# Patient Record
Sex: MALE | Race: WHITE | Employment: OTHER | URBAN - METROPOLITAN AREA
[De-identification: names, ages, dates, MRNs, and addresses within clinical notes are randomized per-mention and may not be internally consistent; named-entity substitution may affect disease eponyms.]

---

## 2017-06-20 ENCOUNTER — HOSPITAL ENCOUNTER (OUTPATIENT)
Dept: NON INVASIVE DIAGNOSTICS | Age: 82
Discharge: HOME | End: 2017-06-20
Attending: FAMILY MEDICINE | Admitting: FAMILY MEDICINE

## 2017-06-20 DIAGNOSIS — R01.1 CARDIAC MURMUR: ICD-10-CM

## 2017-06-20 LAB
LV EF: 63 %
LVEF MODALITY: NORMAL

## 2017-07-14 ENCOUNTER — TELEPHONE (OUTPATIENT)
Dept: CARDIOLOGY CLINIC | Age: 82
End: 2017-07-14

## 2017-07-21 ENCOUNTER — OFFICE VISIT (OUTPATIENT)
Dept: CARDIOLOGY CLINIC | Age: 82
End: 2017-07-21

## 2017-07-21 VITALS
HEART RATE: 69 BPM | BODY MASS INDEX: 32.58 KG/M2 | DIASTOLIC BLOOD PRESSURE: 70 MMHG | WEIGHT: 215 LBS | HEIGHT: 68 IN | SYSTOLIC BLOOD PRESSURE: 136 MMHG

## 2017-07-21 DIAGNOSIS — I35.0 NONRHEUMATIC AORTIC VALVE STENOSIS: Primary | ICD-10-CM

## 2017-07-21 DIAGNOSIS — R06.02 SOB (SHORTNESS OF BREATH): ICD-10-CM

## 2017-07-21 DIAGNOSIS — I48.19 PERSISTENT ATRIAL FIBRILLATION (HCC): ICD-10-CM

## 2017-07-21 PROCEDURE — 99204 OFFICE O/P NEW MOD 45 MIN: CPT | Performed by: INTERNAL MEDICINE

## 2017-07-21 PROCEDURE — 1123F ACP DISCUSS/DSCN MKR DOCD: CPT | Performed by: INTERNAL MEDICINE

## 2017-07-21 PROCEDURE — 4040F PNEUMOC VAC/ADMIN/RCVD: CPT | Performed by: INTERNAL MEDICINE

## 2017-07-21 PROCEDURE — 93000 ELECTROCARDIOGRAM COMPLETE: CPT | Performed by: INTERNAL MEDICINE

## 2017-07-21 PROCEDURE — G8417 CALC BMI ABV UP PARAM F/U: HCPCS | Performed by: INTERNAL MEDICINE

## 2017-07-21 PROCEDURE — G8427 DOCREV CUR MEDS BY ELIG CLIN: HCPCS | Performed by: INTERNAL MEDICINE

## 2017-07-21 PROCEDURE — 1036F TOBACCO NON-USER: CPT | Performed by: INTERNAL MEDICINE

## 2017-07-21 RX ORDER — ALFUZOSIN HYDROCHLORIDE 10 MG/1
10 TABLET, EXTENDED RELEASE ORAL DAILY
Status: ON HOLD | COMMUNITY
End: 2021-12-14 | Stop reason: HOSPADM

## 2017-07-26 ENCOUNTER — HOSPITAL ENCOUNTER (OUTPATIENT)
Dept: NON INVASIVE DIAGNOSTICS | Age: 82
Discharge: HOME | End: 2017-07-26
Attending: INTERNAL MEDICINE | Admitting: INTERNAL MEDICINE

## 2017-07-26 DIAGNOSIS — R06.02 SHORTNESS OF BREATH: ICD-10-CM

## 2017-07-26 LAB
LV EF: 58 %
LVEF MODALITY: NORMAL

## 2017-08-03 ENCOUNTER — TELEPHONE (OUTPATIENT)
Dept: CARDIOLOGY CLINIC | Age: 82
End: 2017-08-03

## 2017-10-09 ENCOUNTER — TELEPHONE (OUTPATIENT)
Dept: CARDIOLOGY CLINIC | Age: 82
End: 2017-10-09

## 2017-10-09 NOTE — TELEPHONE ENCOUNTER
Pt doesn't want to take the Eliquis anymore it will cost over $400/mo pt will just take ASA daily. Pt can't afford that amount every month.  Pls call to advise Thank you

## 2017-10-10 NOTE — TELEPHONE ENCOUNTER
I spoke with pt wife, but could not relay any information-m due to not on HIPAA. I asked her to have him call us back.

## 2017-10-10 NOTE — TELEPHONE ENCOUNTER
I spoke  With pt wife about coumadin clinic. There is not a clinic in [facility]. I spoke with lab in [facility] for options I called pt PCP and his nurse/ MA stated that Dr Fadi Nathan would be willing to take care of the pt. I attempted to call pt wife to inform her of options but got no answer on the phone.

## 2018-01-17 ENCOUNTER — OFFICE VISIT (OUTPATIENT)
Dept: CARDIOLOGY CLINIC | Age: 83
End: 2018-01-17

## 2018-01-17 VITALS
SYSTOLIC BLOOD PRESSURE: 132 MMHG | WEIGHT: 221 LBS | HEIGHT: 67 IN | BODY MASS INDEX: 34.69 KG/M2 | DIASTOLIC BLOOD PRESSURE: 72 MMHG | HEART RATE: 70 BPM

## 2018-01-17 DIAGNOSIS — I35.0 NONRHEUMATIC AORTIC VALVE STENOSIS: Primary | ICD-10-CM

## 2018-01-17 DIAGNOSIS — I48.20 CHRONIC ATRIAL FIBRILLATION (HCC): ICD-10-CM

## 2018-01-17 PROCEDURE — G8484 FLU IMMUNIZE NO ADMIN: HCPCS | Performed by: INTERNAL MEDICINE

## 2018-01-17 PROCEDURE — G8417 CALC BMI ABV UP PARAM F/U: HCPCS | Performed by: INTERNAL MEDICINE

## 2018-01-17 PROCEDURE — 1036F TOBACCO NON-USER: CPT | Performed by: INTERNAL MEDICINE

## 2018-01-17 PROCEDURE — 99214 OFFICE O/P EST MOD 30 MIN: CPT | Performed by: INTERNAL MEDICINE

## 2018-01-17 PROCEDURE — 4040F PNEUMOC VAC/ADMIN/RCVD: CPT | Performed by: INTERNAL MEDICINE

## 2018-01-17 PROCEDURE — 1123F ACP DISCUSS/DSCN MKR DOCD: CPT | Performed by: INTERNAL MEDICINE

## 2018-01-17 PROCEDURE — G8427 DOCREV CUR MEDS BY ELIG CLIN: HCPCS | Performed by: INTERNAL MEDICINE

## 2018-01-17 RX ORDER — WARFARIN SODIUM 1 MG/1
1 TABLET ORAL
COMMUNITY
End: 2019-01-23 | Stop reason: ALTCHOICE

## 2018-01-17 NOTE — PROGRESS NOTES
[address]       H+P // CONSULT // OUTPATIENT VISIT // Elver Nicole     Referring Doctor Sanjay Bruno MD   Encounter Type Followup     CHIEF COMPLAINT     VisitType [] Acute [x] Chronic     Symptom [x] None [] CP [] SOB [] Dizzy [] Palps [] Fatigue     Problems AS, Afib      HISTORY OF PRESENT ILLNESS     Doing well. Denies cp, sob. Compliant with meds. Switched to coumadin due to cost.       Symptom Y N Frequency Duration Severity Modifying Assoc Sx Other   CP [] [x]         SOB [] [x]         Dizzy [] [x]         Syncope [] [x]         Palpitations [] [x]           COMPLIANCE     Category Meds Diet Salt Exercise Tobacco Alcohol Drugs   Compliant [x] [x] [x] [x] [x] [x] [x]   [x]Counseling given on all above above categories    HISTORY/ALLERGIES/ROS     MedHx:  has no past medical history on file. SurgHx:  has a past surgical history that includes hernia repair; Hip Arthroplasty; and shoulder surgery. SocHx:  reports that he has quit smoking. His smoking use included Cigars. He has never used smokeless tobacco. He reports that he drinks alcohol. He reports that he does not use drugs. FamHx: family history is not on file. Allergies: Review of patient's allergies indicates no known allergies. ROS:  [x]Full ROS obtained and negative except as mentioned in HPI     MEDICATIONS      Current Outpatient Prescriptions   Medication Sig Dispense Refill    apixaban (ELIQUIS) 5 MG TABS tablet Take 1 tablet by mouth 2 times daily 60 tablet 6    alfuzosin (UROXATRAL) 10 MG extended release tablet Take 10 mg by mouth daily      Misc Natural Products (OSTEO BI-FLEX ADV JOINT SHIELD PO) Take by mouth      Ibuprofen-diphenhydrAMINE HCl (ADVIL PM) 200-25 MG CAPS Take by mouth       No current facility-administered medications for this visit.       Reviewed with patient and will remain unchanged except as mentioned in A/P  PHYSICAL EXAM     Vitals:    01/17/18 1102   BP: 132/72   Pulse: 70      Gen

## 2018-07-17 PROBLEM — I48.0 PAF (PAROXYSMAL ATRIAL FIBRILLATION) (HCC): Status: ACTIVE | Noted: 2018-07-17

## 2018-07-17 PROBLEM — I48.19 PERSISTENT ATRIAL FIBRILLATION (HCC): Status: ACTIVE | Noted: 2018-07-17

## 2018-07-17 NOTE — PATIENT INSTRUCTIONS
~AS   Date EF Detail   Sx   SOB   Hx   No intervention   NYHA   []I         [x]II           []III          []IV   MPI 7/17 NL Small inferior scar v diaphragm   TTE 6/17 7/18 60% Mod AS MG 24, mild AI, mod TR 69   Plan   Echo 6/18  Educated on s/s of worsening   ~Afib   Date Detail   Type  persistent   R/R  Irreg, controlled   R/RM  Reviewed   CVS  >1   AC/AP  Eliquis->Coumadin due to cost   Plan  Continue current meds

## 2018-07-20 ENCOUNTER — OFFICE VISIT (OUTPATIENT)
Dept: CARDIOLOGY CLINIC | Age: 83
End: 2018-07-20

## 2018-07-20 DIAGNOSIS — I48.19 PERSISTENT ATRIAL FIBRILLATION (HCC): ICD-10-CM

## 2018-07-20 DIAGNOSIS — I35.0 NONRHEUMATIC AORTIC VALVE STENOSIS: Primary | ICD-10-CM

## 2018-07-20 PROCEDURE — 99999 PR OFFICE/OUTPT VISIT,PROCEDURE ONLY: CPT | Performed by: INTERNAL MEDICINE

## 2018-12-03 ENCOUNTER — TELEPHONE (OUTPATIENT)
Dept: CARDIOLOGY CLINIC | Age: 83
End: 2018-12-03

## 2018-12-03 NOTE — TELEPHONE ENCOUNTER
Having increased fatigue, , weakness, and SOB the last few weeks . Made an appt and echo  1/16/19 because that was the first available . Should he be seen sooner ?

## 2018-12-04 NOTE — TELEPHONE ENCOUNTER
Patient can be added on the TAVR clinic on 11/20. Please see if the patient's echo can be scheduled prior to the appointment the same day or any day before the appointment. Thanks!

## 2018-12-20 ENCOUNTER — OFFICE VISIT (OUTPATIENT)
Dept: CARDIOLOGY CLINIC | Age: 83
End: 2018-12-20
Payer: MEDICARE

## 2018-12-20 ENCOUNTER — HOSPITAL ENCOUNTER (OUTPATIENT)
Dept: NON INVASIVE DIAGNOSTICS | Age: 83
Discharge: HOME | End: 2018-12-20
Payer: MEDICARE

## 2018-12-20 VITALS
BODY MASS INDEX: 32.82 KG/M2 | HEIGHT: 67 IN | WEIGHT: 209.12 LBS | DIASTOLIC BLOOD PRESSURE: 72 MMHG | HEART RATE: 68 BPM | SYSTOLIC BLOOD PRESSURE: 156 MMHG

## 2018-12-20 DIAGNOSIS — I48.19 PERSISTENT ATRIAL FIBRILLATION (HCC): ICD-10-CM

## 2018-12-20 DIAGNOSIS — I35.0 NONRHEUMATIC AORTIC VALVE STENOSIS: ICD-10-CM

## 2018-12-20 DIAGNOSIS — I35.0 NONRHEUMATIC AORTIC VALVE STENOSIS: Primary | ICD-10-CM

## 2018-12-20 LAB
LEFT VENTRICULAR EJECTION FRACTION HIGH VALUE: 65 %
LEFT VENTRICULAR EJECTION FRACTION MODE: NORMAL
LV EF: 65 %
LVEF MODALITY: NORMAL

## 2018-12-20 PROCEDURE — 1036F TOBACCO NON-USER: CPT | Performed by: INTERNAL MEDICINE

## 2018-12-20 PROCEDURE — G8427 DOCREV CUR MEDS BY ELIG CLIN: HCPCS | Performed by: INTERNAL MEDICINE

## 2018-12-20 PROCEDURE — 1101F PT FALLS ASSESS-DOCD LE1/YR: CPT | Performed by: INTERNAL MEDICINE

## 2018-12-20 PROCEDURE — G8484 FLU IMMUNIZE NO ADMIN: HCPCS | Performed by: INTERNAL MEDICINE

## 2018-12-20 PROCEDURE — 1123F ACP DISCUSS/DSCN MKR DOCD: CPT | Performed by: INTERNAL MEDICINE

## 2018-12-20 PROCEDURE — 93306 TTE W/DOPPLER COMPLETE: CPT

## 2018-12-20 PROCEDURE — 4040F PNEUMOC VAC/ADMIN/RCVD: CPT | Performed by: INTERNAL MEDICINE

## 2018-12-20 PROCEDURE — 99214 OFFICE O/P EST MOD 30 MIN: CPT | Performed by: INTERNAL MEDICINE

## 2018-12-20 PROCEDURE — G8417 CALC BMI ABV UP PARAM F/U: HCPCS | Performed by: INTERNAL MEDICINE

## 2018-12-20 NOTE — LETTER
60 tablet 6    alfuzosin (UROXATRAL) 10 MG extended release tablet Take 10 mg by mouth daily      Misc Natural Products (OSTEO BI-FLEX ADV JOINT SHIELD PO) Take by mouth      Ibuprofen-diphenhydrAMINE HCl (ADVIL PM) 200-25 MG CAPS Take by mouth       No current facility-administered medications for this visit. Reviewed with patient and will remain unchanged except as mentioned in A/P  PHYSICAL EXAM     Vitals:    12/20/18 1105   BP: (!) 156/72   Pulse:       Gen Alert, coop, no distress Heart  RRR, 4/6   Head NC, AT, no abnorm Abd  Soft, NT, +BS, no mass, no OM   Eyes PER, conj/corn clear Ext  Ext nl, AT, no C/C, tr edema   Nose Nares nl, no drain, NT Pulse 2+ and symmetric   Throat Lips, mucosa, tongue nl Skin Col/text/turg nl, no vis rash/les   Neck S/S, TM, NT, no bruit/JVD Psych Nl mood and affect   Lung CTA-B, unlabored, no DTP Lymph   No cervical or axillary LA   Ch wall NT, no deform Neuro  Nl gross M/S exam     ASSESSMENT AND PLAN     ~AS   Date EF Detail   Sx   SOB   Hx   No intervention   NYHA   []I         [x]II           []III          []IV   MPI 7/17 NL Small inferior scar v diaphragm   TTE 6/17 12/18 60%  65% Mod AS MG 24, mild AI, mod TR 69  Mod AS MG 32, mild AI, mod TR   Plan   Educated on s/s of worsening  Repeat echo in 6 months   ~Afib   Date Detail   Type  persistent   R/R  Irreg, controlled   R/RM  Reviewed   CVS  >1   AC/AP  Eliquis->Coumadin due to cost   Plan  Continue current meds   ~Followup  Interval: 6 months  Tests/Labs: Echo with [address], Margoth Cheatham am scribing for and in the presence of Bharti Grant MD.   Signed, Margoth Cheatham 12/17/18 11:30 AM   Provider Yas Hansen is working as a scribe for and in the presence of me (Bharti Grant MD). Working as a scribe, Margoth Cheatham may have prepopulated components of this note with my historical  intellectual property under my direct supervision.   Any additions to this intellectual

## 2019-01-23 ENCOUNTER — TELEPHONE (OUTPATIENT)
Dept: CARDIOLOGY CLINIC | Age: 84
End: 2019-01-23

## 2019-07-29 PROBLEM — I48.20 CHRONIC ATRIAL FIBRILLATION (HCC): Status: ACTIVE | Noted: 2019-07-29

## 2019-07-29 NOTE — PROGRESS NOTES
Soft, NT, +BS, no mass, no OM   Eyes PER, conj/corn clear Ext  Ext nl, AT, no C/C/E   Nose Nares nl, no drain, NT Pulse 2+ and symmetric   Throat Lips, mucosa, tongue nl Skin Col/text/turg nl, no vis rash/les   Neck S/S, TM, NT, no bruit/JVD Psych Nl mood and affect   Lung CTA-B, unlabored, no DTP Lymph   No cervical or axillary LA   Ch wall NT, no deform Neuro  Nl gross M/S exam     ASSESSMENT AND PLAN      ~AS   Date EF Detail   Sx   SOB   Hx   No intervention   NYHA   []I         [x]II           []III          []IV   MPI 7/17 NL Small inferior scar v diaphragm   TTE 6/17 12/18 7/19 60%  65%  65% Mod AS MG 24, mild AI, mod TR 69  Mod AS MG 32, mild AI, mod TR  Mod AS MG 27, mod TR   Plan   Educated on s/s of worsening  Repeat echo in 6 months, sooner with sx worsening   ~Afib   Date Detail   Type  persistent   R/R  Irreg, controlled   R/RM  Reviewed   CVS  >1   AC/AP  xarelto   Plan  Continue current meds   ~Followup  Interval: 6 months with echo    [address] Param Alvarenga, am scribing for and in the presence of Elias Davalos MD.   SignedParam 07/29/19 3:35 PM   Provider Marlin Emily is working as a scribe for and in the presence of me (Elias Davalos MD). Working as a scribe, Param Foster may have prepopulated components of this note with my historical  intellectual property under my direct supervision. Any additions to this intellectual property were performed in my presence and at my direction.   Furthermore, the content and accuracy of this note have been reviewed by me Elias Davalos MD).  8/1/2019 2:05 PM

## 2019-07-31 ENCOUNTER — HOSPITAL ENCOUNTER (OUTPATIENT)
Dept: NON INVASIVE DIAGNOSTICS | Age: 84
Discharge: HOME | End: 2019-07-31
Payer: MEDICARE

## 2019-07-31 DIAGNOSIS — I35.0 NONRHEUMATIC AORTIC VALVE STENOSIS: ICD-10-CM

## 2019-07-31 LAB
LV EF: 65 %
LVEF MODALITY: NORMAL

## 2019-07-31 PROCEDURE — 93306 TTE W/DOPPLER COMPLETE: CPT

## 2019-08-01 ENCOUNTER — OFFICE VISIT (OUTPATIENT)
Dept: CARDIOLOGY CLINIC | Age: 84
End: 2019-08-01
Payer: MEDICARE

## 2019-08-01 VITALS
DIASTOLIC BLOOD PRESSURE: 84 MMHG | BODY MASS INDEX: 33.09 KG/M2 | WEIGHT: 210.8 LBS | HEIGHT: 67 IN | HEART RATE: 56 BPM | OXYGEN SATURATION: 92 % | SYSTOLIC BLOOD PRESSURE: 130 MMHG

## 2019-08-01 DIAGNOSIS — I35.0 NONRHEUMATIC AORTIC VALVE STENOSIS: Primary | ICD-10-CM

## 2019-08-01 DIAGNOSIS — I48.20 CHRONIC ATRIAL FIBRILLATION (HCC): ICD-10-CM

## 2019-08-01 PROCEDURE — 1036F TOBACCO NON-USER: CPT | Performed by: INTERNAL MEDICINE

## 2019-08-01 PROCEDURE — G8417 CALC BMI ABV UP PARAM F/U: HCPCS | Performed by: INTERNAL MEDICINE

## 2019-08-01 PROCEDURE — 4040F PNEUMOC VAC/ADMIN/RCVD: CPT | Performed by: INTERNAL MEDICINE

## 2019-08-01 PROCEDURE — 99213 OFFICE O/P EST LOW 20 MIN: CPT | Performed by: INTERNAL MEDICINE

## 2019-08-01 PROCEDURE — G8427 DOCREV CUR MEDS BY ELIG CLIN: HCPCS | Performed by: INTERNAL MEDICINE

## 2019-08-01 PROCEDURE — 1123F ACP DISCUSS/DSCN MKR DOCD: CPT | Performed by: INTERNAL MEDICINE

## 2019-08-01 NOTE — LETTER
Braulio Khan MD).  8/1/2019 2:05 PM            If you have questions, please do not hesitate to call me. I look forward to following Charisse Miller along with you.     Sincerely,        Nilson Petersen MD

## 2020-02-06 ENCOUNTER — OFFICE VISIT (OUTPATIENT)
Dept: CARDIOLOGY CLINIC | Age: 85
End: 2020-02-06
Payer: MEDICARE

## 2020-02-06 ENCOUNTER — HOSPITAL ENCOUNTER (OUTPATIENT)
Dept: NON INVASIVE DIAGNOSTICS | Age: 85
Discharge: HOME | End: 2020-02-06
Payer: MEDICARE

## 2020-02-06 ENCOUNTER — HOSPITAL ENCOUNTER (OUTPATIENT)
Age: 85
Discharge: HOME | End: 2020-02-06
Payer: MEDICARE

## 2020-02-06 VITALS
BODY MASS INDEX: 31.88 KG/M2 | HEART RATE: 67 BPM | HEIGHT: 67 IN | DIASTOLIC BLOOD PRESSURE: 82 MMHG | WEIGHT: 203.12 LBS | SYSTOLIC BLOOD PRESSURE: 162 MMHG | OXYGEN SATURATION: 96 %

## 2020-02-06 LAB
BUN BLDV-MCNC: 20 MG/DL (ref 7–20)
CREAT SERPL-MCNC: 0.9 MG/DL (ref 0.8–1.3)
GFR AFRICAN AMERICAN: >60
GFR NON-AFRICAN AMERICAN: >60
LV EF: 65 %
LVEF MODALITY: NORMAL

## 2020-02-06 PROCEDURE — 36415 COLL VENOUS BLD VENIPUNCTURE: CPT

## 2020-02-06 PROCEDURE — 1036F TOBACCO NON-USER: CPT | Performed by: INTERNAL MEDICINE

## 2020-02-06 PROCEDURE — G8427 DOCREV CUR MEDS BY ELIG CLIN: HCPCS | Performed by: INTERNAL MEDICINE

## 2020-02-06 PROCEDURE — 4040F PNEUMOC VAC/ADMIN/RCVD: CPT | Performed by: INTERNAL MEDICINE

## 2020-02-06 PROCEDURE — 82565 ASSAY OF CREATININE: CPT

## 2020-02-06 PROCEDURE — 93306 TTE W/DOPPLER COMPLETE: CPT

## 2020-02-06 PROCEDURE — G8484 FLU IMMUNIZE NO ADMIN: HCPCS | Performed by: INTERNAL MEDICINE

## 2020-02-06 PROCEDURE — G8417 CALC BMI ABV UP PARAM F/U: HCPCS | Performed by: INTERNAL MEDICINE

## 2020-02-06 PROCEDURE — 1123F ACP DISCUSS/DSCN MKR DOCD: CPT | Performed by: INTERNAL MEDICINE

## 2020-02-06 PROCEDURE — 99214 OFFICE O/P EST MOD 30 MIN: CPT | Performed by: INTERNAL MEDICINE

## 2020-02-06 PROCEDURE — 84520 ASSAY OF UREA NITROGEN: CPT

## 2020-02-06 RX ORDER — TAMSULOSIN HYDROCHLORIDE 0.4 MG/1
0.4 CAPSULE ORAL DAILY
COMMUNITY
Start: 2020-01-23 | End: 2020-05-20 | Stop reason: ALTCHOICE

## 2020-02-06 NOTE — LETTER
[address] [address]  Phone: [phone]  Fax: [phone]    Linda Mathew MD        February 6, 2020     Sherri Aleman, [address] [place] [address] Dr    Patient: Milena Pritchett  MR Number: [id number]  YOB: 1935  Date of Visit: 2/6/2020    Dear Dr. Sherri Aleman:      [address]     H+P // CONSULT // OUTPATIENT VISIT // Toñito Mcnamara     Referring Doctor Sherri Aleman MD   Encounter Type Followup     CHIEF COMPLAINT     Visit Type Chronic   Symptoms SOB increasing   Problems AS, cAFIB     HISTORY OF PRESENT ILLNESS     ? GEN - Doing well. Increased sob with exertion. ? AS - severe by echo today. SOB increasing  ? cAFIB - No palpitations. Tolerating xarelto without bleeding. ? MED - Compliant with CV meds listed below without notable side effects     HISTORY/ALLERGIES/ROS     MedHx:  has no past medical history on file. SurgHx:  has a past surgical history that includes hernia repair; Hip Arthroplasty; and shoulder surgery. SocHx:   reports that he has quit smoking. His smoking use included cigars. He has never used smokeless tobacco. He reports current alcohol use. He reports that he does not use drugs. FamHx: family history is not on file. Allergies: Patient has no known allergies. ROS:  [x]Full ROS obtained and negative except as mentioned in HPI     MEDICATIONS      Current Outpatient Medications   Medication Sig Dispense Refill    XARELTO 20 MG TABS tablet TAKE ONE TABLET BY MOUTH DAILY WITH BREAKFAST 30 tablet 11    alfuzosin (UROXATRAL) 10 MG extended release tablet Take 10 mg by mouth daily      Misc Natural Products (OSTEO BI-FLEX ADV JOINT SHIELD PO) Take by mouth      Ibuprofen-diphenhydrAMINE HCl (ADVIL PM) 200-25 MG CAPS Take by mouth as needed        No current facility-administered medications for this visit. Robel Fairbanks MD).  2/6/2020 10:34 AM          If you have questions, please do not hesitate to call me. I look forward to following Melida Urbina along with you.     Sincerely,        Allyssa Marino MD

## 2020-02-19 ENCOUNTER — HOSPITAL ENCOUNTER (OUTPATIENT)
Dept: CT IMAGING | Age: 85
Discharge: HOME | End: 2020-02-19
Payer: MEDICARE

## 2020-02-19 ENCOUNTER — HOSPITAL ENCOUNTER (OUTPATIENT)
Dept: VASCULAR LAB | Age: 85
Discharge: HOME | End: 2020-02-19
Payer: MEDICARE

## 2020-02-19 PROCEDURE — 74174 CTA ABD&PLVS W/CONTRAST: CPT

## 2020-02-19 PROCEDURE — 93880 EXTRACRANIAL BILAT STUDY: CPT

## 2020-02-19 PROCEDURE — 6360000004 HC RX CONTRAST MEDICATION: Performed by: INTERNAL MEDICINE

## 2020-02-19 RX ADMIN — IOPAMIDOL 150 ML: 755 INJECTION, SOLUTION INTRAVENOUS at 12:36

## 2020-02-26 ENCOUNTER — HOSPITAL ENCOUNTER (OUTPATIENT)
Dept: CARDIAC CATH/INVASIVE PROCEDURES | Age: 85
Discharge: HOME | End: 2020-02-27
Attending: INTERNAL MEDICINE | Admitting: INTERNAL MEDICINE
Payer: MEDICARE

## 2020-02-26 VITALS
SYSTOLIC BLOOD PRESSURE: 189 MMHG | DIASTOLIC BLOOD PRESSURE: 75 MMHG | HEIGHT: 67 IN | OXYGEN SATURATION: 97 % | HEART RATE: 53 BPM | BODY MASS INDEX: 31.86 KG/M2 | WEIGHT: 203 LBS | TEMPERATURE: 98 F | RESPIRATION RATE: 16 BRPM

## 2020-02-26 LAB
ANION GAP SERPL CALCULATED.3IONS-SCNC: 10 MMOL/L (ref 3–16)
BASOPHILS ABSOLUTE: 0 K/UL (ref 0–0.2)
BASOPHILS RELATIVE PERCENT: 0.6 %
BUN BLDV-MCNC: 19 MG/DL (ref 7–20)
CALCIUM SERPL-MCNC: 9.6 MG/DL (ref 8.3–10.6)
CHLORIDE BLD-SCNC: 101 MMOL/L (ref 99–110)
CO2: 27 MMOL/L (ref 21–32)
CREAT SERPL-MCNC: 0.8 MG/DL (ref 0.8–1.3)
EOSINOPHILS ABSOLUTE: 0.1 K/UL (ref 0–0.6)
EOSINOPHILS RELATIVE PERCENT: 1.1 %
GFR AFRICAN AMERICAN: >60
GFR NON-AFRICAN AMERICAN: >60
GLUCOSE BLD-MCNC: 100 MG/DL (ref 70–99)
HCT VFR BLD CALC: 41.6 % (ref 40.5–52.5)
HEMOGLOBIN: 13.8 G/DL (ref 13.5–17.5)
LYMPHOCYTES ABSOLUTE: 1 K/UL (ref 1–5.1)
LYMPHOCYTES RELATIVE PERCENT: 19 %
MCH RBC QN AUTO: 30.5 PG (ref 26–34)
MCHC RBC AUTO-ENTMCNC: 33.1 G/DL (ref 31–36)
MCV RBC AUTO: 92.2 FL (ref 80–100)
MONOCYTES ABSOLUTE: 0.6 K/UL (ref 0–1.3)
MONOCYTES RELATIVE PERCENT: 10.6 %
NEUTROPHILS ABSOLUTE: 3.8 K/UL (ref 1.7–7.7)
NEUTROPHILS RELATIVE PERCENT: 68.7 %
PDW BLD-RTO: 15.6 % (ref 12.4–15.4)
PLATELET # BLD: 195 K/UL (ref 135–450)
PMV BLD AUTO: 7.9 FL (ref 5–10.5)
POTASSIUM SERPL-SCNC: 4.5 MMOL/L (ref 3.5–5.1)
RBC # BLD: 4.51 M/UL (ref 4.2–5.9)
SODIUM BLD-SCNC: 138 MMOL/L (ref 136–145)
WBC # BLD: 5.5 K/UL (ref 4–11)

## 2020-02-26 PROCEDURE — 99153 MOD SED SAME PHYS/QHP EA: CPT

## 2020-02-26 PROCEDURE — C1894 INTRO/SHEATH, NON-LASER: HCPCS

## 2020-02-26 PROCEDURE — 85025 COMPLETE CBC W/AUTO DIFF WBC: CPT

## 2020-02-26 PROCEDURE — 6360000002 HC RX W HCPCS

## 2020-02-26 PROCEDURE — 93005 ELECTROCARDIOGRAM TRACING: CPT | Performed by: INTERNAL MEDICINE

## 2020-02-26 PROCEDURE — 2709999900 HC NON-CHARGEABLE SUPPLY

## 2020-02-26 PROCEDURE — 93454 CORONARY ARTERY ANGIO S&I: CPT | Performed by: INTERNAL MEDICINE

## 2020-02-26 PROCEDURE — 2500000003 HC RX 250 WO HCPCS

## 2020-02-26 PROCEDURE — 93454 CORONARY ARTERY ANGIO S&I: CPT

## 2020-02-26 PROCEDURE — C1769 GUIDE WIRE: HCPCS

## 2020-02-26 PROCEDURE — 36415 COLL VENOUS BLD VENIPUNCTURE: CPT

## 2020-02-26 PROCEDURE — 80048 BASIC METABOLIC PNL TOTAL CA: CPT

## 2020-02-26 PROCEDURE — 6360000004 HC RX CONTRAST MEDICATION: Performed by: INTERNAL MEDICINE

## 2020-02-26 PROCEDURE — 99152 MOD SED SAME PHYS/QHP 5/>YRS: CPT

## 2020-02-26 RX ADMIN — IOPAMIDOL 26 ML: 755 INJECTION, SOLUTION INTRAVENOUS at 16:05

## 2020-02-26 NOTE — OP NOTE
[address]   Procedure Note      Procedure: Cors only  Indication: AS, SOB  Consent: Verbal and/or written consent obtained prior to procedure. Sedation: Minimal conscious sedation utilized for comfort. Complic: None  EBL:  <95DK  Specimens: None  Fluoro: 2.3 min  Contrast: 26 cc  Access: RRA  Ultrasound: Ultrasound guidance used to determine aforementioned artery patency, size (>2mm), anatomic variations and ideal puncture location. Real-time ultrasound utilized concurrent with vascular needle entry into the artery. Image(s) permanently recorded and reported in the patient chart.       Findings:   LM  Normal  LAD  Normal  CX  Normal  RCA  Normal  LVG  NA  LVEDP NA     Intervention:   None    Post Cath Dx:   Normal coronaries  Continued workup for TAVR

## 2020-02-26 NOTE — H&P
NT, no deform Neuro  Nl gross M/S exam       ASSESSMENT AND PLAN     ~AS  Severe, [facility] today

## 2020-02-26 NOTE — PRE SEDATION
visible)    ASA Classification:  Class 2 - A normal healthy patient with mild systemic disease      Kanwal Scale: Activity:  2 - Able to move 4 extremities voluntarily on command  Respiration:  2 - Able to breathe deeply and cough freely  Circulation:  2 - BP+/- 20mmHg of normal  Consciousness:  2 - Fully awake  Oxygen Saturation (color):  2 - Able to maintain oxygen saturation >92% on room air    Sedation/Anesthesia Plan:  Guard the patient's safety and welfare. Minimize physical discomfort and pain. Minimize negative psychological responses to treatment by providing sedation and analgesia and maximize the potential amnesia. Patient to meet pre-procedure discharge plan.     Medication Planned:  midazolam intravenously and fentanyl intravenously    Patient is an appropriate candidate for plan of sedation: yes      Electronically signed by Karrie Faulkner MD on 2/26/2020 at 3:31 PM

## 2020-02-27 LAB
EKG ATRIAL RATE: 45 BPM
EKG DIAGNOSIS: NORMAL
EKG Q-T INTERVAL: 410 MS
EKG QRS DURATION: 96 MS
EKG QTC CALCULATION (BAZETT): 384 MS
EKG R AXIS: 20 DEGREES
EKG T AXIS: -22 DEGREES
EKG VENTRICULAR RATE: 53 BPM

## 2020-02-27 PROCEDURE — 93010 ELECTROCARDIOGRAM REPORT: CPT | Performed by: INTERNAL MEDICINE

## 2020-03-05 ENCOUNTER — OFFICE VISIT (OUTPATIENT)
Dept: CARDIOTHORACIC SURGERY | Age: 85
End: 2020-03-05
Payer: MEDICARE

## 2020-03-05 ENCOUNTER — TELEPHONE (OUTPATIENT)
Dept: CARDIOLOGY CLINIC | Age: 85
End: 2020-03-05

## 2020-03-05 VITALS
OXYGEN SATURATION: 98 % | DIASTOLIC BLOOD PRESSURE: 64 MMHG | BODY MASS INDEX: 30.62 KG/M2 | TEMPERATURE: 97.4 F | SYSTOLIC BLOOD PRESSURE: 118 MMHG | WEIGHT: 202 LBS | HEART RATE: 56 BPM | HEIGHT: 68 IN

## 2020-03-05 PROCEDURE — 99204 OFFICE O/P NEW MOD 45 MIN: CPT | Performed by: THORACIC SURGERY (CARDIOTHORACIC VASCULAR SURGERY)

## 2020-03-05 PROCEDURE — G8427 DOCREV CUR MEDS BY ELIG CLIN: HCPCS | Performed by: THORACIC SURGERY (CARDIOTHORACIC VASCULAR SURGERY)

## 2020-03-05 PROCEDURE — 4040F PNEUMOC VAC/ADMIN/RCVD: CPT | Performed by: THORACIC SURGERY (CARDIOTHORACIC VASCULAR SURGERY)

## 2020-03-05 PROCEDURE — 1123F ACP DISCUSS/DSCN MKR DOCD: CPT | Performed by: THORACIC SURGERY (CARDIOTHORACIC VASCULAR SURGERY)

## 2020-03-05 PROCEDURE — G8484 FLU IMMUNIZE NO ADMIN: HCPCS | Performed by: THORACIC SURGERY (CARDIOTHORACIC VASCULAR SURGERY)

## 2020-03-05 PROCEDURE — G8417 CALC BMI ABV UP PARAM F/U: HCPCS | Performed by: THORACIC SURGERY (CARDIOTHORACIC VASCULAR SURGERY)

## 2020-03-05 PROCEDURE — 1036F TOBACCO NON-USER: CPT | Performed by: THORACIC SURGERY (CARDIOTHORACIC VASCULAR SURGERY)

## 2020-03-05 SDOH — HEALTH STABILITY: MENTAL HEALTH: HOW MANY STANDARD DRINKS CONTAINING ALCOHOL DO YOU HAVE ON A TYPICAL DAY?: 5 OR 6

## 2020-03-05 NOTE — PROGRESS NOTES
Department of Cardiovascular & Thoracic Surgery  History and Physical / Consultation          PCP: Augusta Goldstein MD    Referring Physician: Lorie Gamble    DIAGNOSIS:  Severe symptomatic AS    CHIEF COMPLAINT:  dypnea with minimal exertion    History Obtained From:  patient, electronic medical record    HISTORY OF PRESENT ILLNESS:      The patient is a 81 y.o. male with significant past medical history of PAF who presents with severe symptomatic AS. His dyspnea was worse in the cold weather. Past Medical History:        Diagnosis Date    Atrial fibrillation ([address]     BPH (benign prostatic hyperplasia)        Past Surgical History:        Procedure Laterality Date    HERNIA REPAIR Left     HIP ARTHROPLASTY Left     SHOULDER SURGERY Left        Medications:   Current Outpatient Medications   Medication Sig Dispense Refill    tamsulosin (FLOMAX) 0.4 MG capsule       XARELTO 20 MG TABS tablet TAKE ONE TABLET BY MOUTH DAILY WITH BREAKFAST 30 tablet 11    alfuzosin (UROXATRAL) 10 MG extended release tablet Take 10 mg by mouth daily      Misc Natural Products (OSTEO BI-FLEX ADV JOINT SHIELD PO) Take by mouth      Ibuprofen-diphenhydrAMINE HCl (ADVIL PM) 200-25 MG CAPS Take by mouth as needed        No current facility-administered medications for this visit. Allergies:  Patient has no known allergies. Social History:    TOBACCO:   reports that he has quit smoking. His smoking use included cigars. He has quit using smokeless tobacco.  His smokeless tobacco use included chew. ETOH:   reports current alcohol use of about 7.0 standard drinks of alcohol per week. DRUGS:   reports no history of drug use. LIFESTYLE: Farmer    MARITAL STATUS:    OCCUPATION:  [employer]    Family History:      Non-contributory (grandchildren with congenital siunus node dysfunction)    REVIEW OF SYSTEMS:      Personally reviewed and agree with Deyanira Tena's progress note from 3/5/2020.     PHYSICAL EXAM:    VITALS:  BP 118/64 (Site: Left Upper Arm)   Pulse 56   Temp 97.4 °F (36.3 °C) (Oral)   Ht 5' 8\" (1.727 m)   Wt 202 lb (91.6 kg)   SpO2 98%   BMI 30.71 kg/m²     Eyes:  lids and lashes normal, pupils equal and round, extra ocular muscles intact, sclera clear, conjunctiva normal    Head/ENT:  Normocephalic, atraumatic, normal gums, & palate, oropharynx without erythema or exudates    Neck:  supple, symmetrical, trachea midline, no lymphadenopathy, no jugular venous distension, no carotid bruits and MASSES:  no masses    Lungs:  no increased work of breathing, good air exchange, no retractions and clear to auscultation, no crackles or wheezing, no palpable / percussible abnormalities    Cardiovascular:  irregularly irregular rhythm and systolic murmur: holosystolic 3/6, harsh throughout the precordium, loudest at right 2nd ICS    Pulses:  Right dorsalis pedis 2, Left dorsalis pedis 2, Right posterior tibial 2, Left Posterior tibial 2, Right Femoral 2, Left Femoral 2, Right radial 2, and Left radial 2    Abdomen:  Soft, normal bowel sounds, non-tender, no hepatosplenomegaly, aorta likely normal and bruits absent    Musculoskeletal:  Back is straight and non-tender,  No CVAT, full ROM of upper and lower extremities. Extremities:   No clubbing, or cyanosis, or edema     Skin: warm and normal turgor, no ulcers, infections, or rashes, no jaundice    Neurological: awake, alert and oriented x 3, motor 5/5 bilateral upper and lower extremities, sensation grossly intact    Psychiatric: Mood and affect appear appropriate    DATA:    Other diagnostic test:  [facility] no occlusive CAD    ASSESSMENT AND PLAN:    Severe symptomatic AS, PAF    I discussed TAVR vs SAVR vs medicine / doing nothing with the patient, his wife and daughter. I agree TAVR is the best option for him given his active lifestyle and age. I answered all their questions. He will need to hold xarelto prior to TAVR.     Electronically signed by Torres Rousseau MD on 3/5/2020 at 12:34 PM

## 2020-03-05 NOTE — PROGRESS NOTES
Review of Systems:  Constitutional:  No night sweats, headaches, weight loss. Eyes:  No glaucoma, cataracts. Uses readers  ENMT:  No nosebleeds, went to [facility], all work done  Cardiac:  A-Fib. Aortic valve disease  Vascular:  No claudication,no  varicosities. GI:  No PUD, heartburn. :  No kidney stones, frequent UTIs  Musculoskeletal:  + arthritis,   Respiratory:  Get pneumonia almost every year, occasional couphing spells. Integumentary:  No dermatitis, itching, rash. Neurological:  No stroke, no TIAs,no  Seizures. numbnes in finger and thumb on R hand  Psychiatric:  No depression,no  anxiety. Endocrine: No diabetes, no thyroid issues. Hematologic:  Easy  bleeding, +easy bruising. Immunologic:  No known cancer,no  steroid therapies.

## 2020-03-05 NOTE — TELEPHONE ENCOUNTER
Spoke to pts wife. Aware that TAVR is scheduled for 3/31/20 with PAT on 3/19/20. Aware to arrive for PAT at 2pm and that they will see Dr. Pako Pruitt in office to follow. Verbalized understanding of all.  Rupal SCHAEFER

## 2020-03-17 ENCOUNTER — TELEPHONE (OUTPATIENT)
Dept: CARDIOLOGY CLINIC | Age: 85
End: 2020-03-17

## 2020-03-17 NOTE — TELEPHONE ENCOUNTER
Pt wife called stating that pt wants to postpone TAVR until the coronavirus epidemic has passed. She states that he is feeling well with no specific HF symptoms at this time. Let her know to contact us if he develops sx and that we will reschedule his TAVR at a later date.  Rupal SCHAEFER

## 2020-04-02 ENCOUNTER — TELEPHONE (OUTPATIENT)
Dept: CARDIOLOGY CLINIC | Age: 85
End: 2020-04-02

## 2020-04-02 NOTE — TELEPHONE ENCOUNTER
Spoke to pts wife regarding TAVR date. She states that pt wants to wait until at least May for his procedure due to coronavirus pandemic. She states that he is doing well with no SOB, CP, dizziness, syncope. States that he gets tired but is resting when not active. Instructed to call if he experiences any HF symptoms. Verbalized understanding of all.  Rupal SCHAEFER

## 2020-04-27 ENCOUNTER — TELEPHONE (OUTPATIENT)
Dept: CARDIOLOGY CLINIC | Age: 85
End: 2020-04-27

## 2020-05-01 ENCOUNTER — TELEPHONE (OUTPATIENT)
Dept: CARDIOLOGY CLINIC | Age: 85
End: 2020-05-01

## 2020-05-06 NOTE — PROGRESS NOTES
abnorm Abd  Soft, NT, +BS, no mass, no OM   Eyes PER, conj/corn clear Ext  Ext nl, AT, no C/C/E   Nose Nares nl, no drain, NT Pulse 2+ and symmetric   Throat Lips, mucosa, tongue nl Skin Col/text/turg nl, no vis rash/les   Neck S/S, TM, NT, no bruit/JVD Psych Nl mood and affect   Lung CTA-B, unlabored, no DTP Lymph   No cervical or axillary LA   Ch wall NT, no deform Neuro  Nl gross M/S exam     ASSESSMENT AND PLAN      *AS   Date EF Detail   Sx   SOB   NYHA   []I         [x]II           []III          []IV   MPI 7/17 NL Small inferior scar v diaphragm   TTE 6/17 12/18 7/19 2/20 60%  65%  65%  65% Mod AS MG 24, mild AI, mod TR 69  Mod AS MG 32, mild AI, mod TR  Mod AS MG 27, mod TR  Sev AS MG 40, mod TR   LHC 2/20  Normal Cors Jun [place])   Plan   TAVR next week   *AFIB  Hx Persistent, CVS >1   Status Irregular, on xarelto  Plan Continue xarelto  *COMPLIANCE  Status Compliant  Plan Discussed importance of compliance with meds/diet/salt/exercise; avoid tob/alc/drugs; patient verbalized understanding  *FOLLOWUP  After tavr    [address], am scribing for and in the presence of Tami Hanna MD.   Jerry Parisi 05/06/20 2:36 PM   Provider Milagro Ray is working as a scribe for and in the presence of me (Tami Hanna MD). Working as a scribeJerry may have prepopulated components of this note with my historical  intellectual property under my direct supervision. Any additions to this intellectual property were performed in my presence and at my direction.   Furthermore, the content and accuracy of this note have been reviewed by me Tami Hanna MD).  5/7/2020 8:12 AM

## 2020-05-07 ENCOUNTER — HOSPITAL ENCOUNTER (OUTPATIENT)
Dept: PREADMISSION TESTING | Age: 85
Discharge: HOME | End: 2020-05-11
Payer: MEDICARE

## 2020-05-07 ENCOUNTER — OFFICE VISIT (OUTPATIENT)
Dept: CARDIOLOGY CLINIC | Age: 85
End: 2020-05-07
Payer: MEDICARE

## 2020-05-07 ENCOUNTER — ANESTHESIA EVENT (OUTPATIENT)
Dept: OPERATING ROOM | Age: 85
DRG: 267 | End: 2020-05-07
Payer: MEDICARE

## 2020-05-07 ENCOUNTER — OFFICE VISIT (OUTPATIENT)
Dept: PRIMARY CARE CLINIC | Age: 85
End: 2020-05-07

## 2020-05-07 VITALS
HEART RATE: 50 BPM | WEIGHT: 204 LBS | RESPIRATION RATE: 16 BRPM | SYSTOLIC BLOOD PRESSURE: 176 MMHG | TEMPERATURE: 97.2 F | OXYGEN SATURATION: 96 % | HEIGHT: 67 IN | BODY MASS INDEX: 32.02 KG/M2 | DIASTOLIC BLOOD PRESSURE: 76 MMHG

## 2020-05-07 VITALS
SYSTOLIC BLOOD PRESSURE: 138 MMHG | HEIGHT: 67 IN | WEIGHT: 204.6 LBS | HEART RATE: 56 BPM | BODY MASS INDEX: 32.11 KG/M2 | DIASTOLIC BLOOD PRESSURE: 70 MMHG

## 2020-05-07 LAB
ABO/RH: NORMAL
ALBUMIN SERPL-MCNC: 4.3 G/DL (ref 3.4–5)
ALP BLD-CCNC: 98 U/L (ref 40–129)
ALT SERPL-CCNC: 26 U/L (ref 10–40)
ANION GAP SERPL CALCULATED.3IONS-SCNC: 10 MMOL/L (ref 3–16)
ANTIBODY SCREEN: NORMAL
AST SERPL-CCNC: 26 U/L (ref 15–37)
BASOPHILS ABSOLUTE: 0 K/UL (ref 0–0.2)
BASOPHILS RELATIVE PERCENT: 0.4 %
BILIRUB SERPL-MCNC: 1.2 MG/DL (ref 0–1)
BILIRUBIN DIRECT: <0.2 MG/DL (ref 0–0.3)
BILIRUBIN URINE: NEGATIVE
BILIRUBIN, INDIRECT: ABNORMAL MG/DL (ref 0–1)
BLOOD, URINE: ABNORMAL
BUN BLDV-MCNC: 19 MG/DL (ref 7–20)
CALCIUM SERPL-MCNC: 9.5 MG/DL (ref 8.3–10.6)
CHLORIDE BLD-SCNC: 101 MMOL/L (ref 99–110)
CLARITY: CLEAR
CO2: 27 MMOL/L (ref 21–32)
COLOR: YELLOW
CREAT SERPL-MCNC: 0.8 MG/DL (ref 0.8–1.3)
EKG ATRIAL RATE: 300 BPM
EKG DIAGNOSIS: NORMAL
EKG Q-T INTERVAL: 476 MS
EKG QRS DURATION: 92 MS
EKG QTC CALCULATION (BAZETT): 406 MS
EKG R AXIS: 17 DEGREES
EKG T AXIS: -35 DEGREES
EKG VENTRICULAR RATE: 44 BPM
EOSINOPHILS ABSOLUTE: 0.1 K/UL (ref 0–0.6)
EOSINOPHILS RELATIVE PERCENT: 1.8 %
EPITHELIAL CELLS, UA: 0 /HPF (ref 0–5)
GFR AFRICAN AMERICAN: >60
GFR NON-AFRICAN AMERICAN: >60
GLUCOSE BLD-MCNC: 101 MG/DL (ref 70–99)
GLUCOSE URINE: NEGATIVE MG/DL
HCT VFR BLD CALC: 41.6 % (ref 40.5–52.5)
HEMOGLOBIN: 13.8 G/DL (ref 13.5–17.5)
HYALINE CASTS: 1 /LPF (ref 0–8)
INR BLD: 2.03 (ref 0.86–1.14)
KETONES, URINE: NEGATIVE MG/DL
LEUKOCYTE ESTERASE, URINE: NEGATIVE
LYMPHOCYTES ABSOLUTE: 0.9 K/UL (ref 1–5.1)
LYMPHOCYTES RELATIVE PERCENT: 25 %
MAGNESIUM: 2.2 MG/DL (ref 1.8–2.4)
MCH RBC QN AUTO: 31 PG (ref 26–34)
MCHC RBC AUTO-ENTMCNC: 33 G/DL (ref 31–36)
MCV RBC AUTO: 94 FL (ref 80–100)
MICROSCOPIC EXAMINATION: YES
MONOCYTES ABSOLUTE: 0.4 K/UL (ref 0–1.3)
MONOCYTES RELATIVE PERCENT: 11.5 %
NEUTROPHILS ABSOLUTE: 2.3 K/UL (ref 1.7–7.7)
NEUTROPHILS RELATIVE PERCENT: 61.3 %
NITRITE, URINE: NEGATIVE
PDW BLD-RTO: 15.3 % (ref 12.4–15.4)
PH UA: 5 (ref 5–8)
PLATELET # BLD: 153 K/UL (ref 135–450)
PMV BLD AUTO: 8.5 FL (ref 5–10.5)
POTASSIUM SERPL-SCNC: 4.4 MMOL/L (ref 3.5–5.1)
PROTEIN UA: NEGATIVE MG/DL
PROTHROMBIN TIME: 23.7 SEC (ref 10–13.2)
RBC # BLD: 4.43 M/UL (ref 4.2–5.9)
RBC UA: 2 /HPF (ref 0–4)
SODIUM BLD-SCNC: 138 MMOL/L (ref 136–145)
SPECIFIC GRAVITY UA: 1.01 (ref 1–1.03)
TOTAL PROTEIN: 7.3 G/DL (ref 6.4–8.2)
URINE REFLEX TO CULTURE: ABNORMAL
URINE TYPE: ABNORMAL
UROBILINOGEN, URINE: 0.2 E.U./DL
WBC # BLD: 3.7 K/UL (ref 4–11)
WBC UA: 0 /HPF (ref 0–5)

## 2020-05-07 PROCEDURE — 93005 ELECTROCARDIOGRAM TRACING: CPT | Performed by: INTERNAL MEDICINE

## 2020-05-07 PROCEDURE — 4040F PNEUMOC VAC/ADMIN/RCVD: CPT | Performed by: INTERNAL MEDICINE

## 2020-05-07 PROCEDURE — 99999 PR OFFICE/OUTPT VISIT,PROCEDURE ONLY: CPT | Performed by: NURSE PRACTITIONER

## 2020-05-07 PROCEDURE — 86900 BLOOD TYPING SEROLOGIC ABO: CPT

## 2020-05-07 PROCEDURE — 86901 BLOOD TYPING SEROLOGIC RH(D): CPT

## 2020-05-07 PROCEDURE — 83735 ASSAY OF MAGNESIUM: CPT

## 2020-05-07 PROCEDURE — 85610 PROTHROMBIN TIME: CPT

## 2020-05-07 PROCEDURE — 1123F ACP DISCUSS/DSCN MKR DOCD: CPT | Performed by: INTERNAL MEDICINE

## 2020-05-07 PROCEDURE — 85025 COMPLETE CBC W/AUTO DIFF WBC: CPT

## 2020-05-07 PROCEDURE — 93010 ELECTROCARDIOGRAM REPORT: CPT | Performed by: INTERNAL MEDICINE

## 2020-05-07 PROCEDURE — 1036F TOBACCO NON-USER: CPT | Performed by: INTERNAL MEDICINE

## 2020-05-07 PROCEDURE — 81001 URINALYSIS AUTO W/SCOPE: CPT

## 2020-05-07 PROCEDURE — 80048 BASIC METABOLIC PNL TOTAL CA: CPT

## 2020-05-07 PROCEDURE — 80076 HEPATIC FUNCTION PANEL: CPT

## 2020-05-07 PROCEDURE — G8417 CALC BMI ABV UP PARAM F/U: HCPCS | Performed by: INTERNAL MEDICINE

## 2020-05-07 PROCEDURE — 36415 COLL VENOUS BLD VENIPUNCTURE: CPT

## 2020-05-07 PROCEDURE — G8427 DOCREV CUR MEDS BY ELIG CLIN: HCPCS | Performed by: INTERNAL MEDICINE

## 2020-05-07 PROCEDURE — 99214 OFFICE O/P EST MOD 30 MIN: CPT | Performed by: INTERNAL MEDICINE

## 2020-05-07 PROCEDURE — 94010 BREATHING CAPACITY TEST: CPT

## 2020-05-07 PROCEDURE — 86850 RBC ANTIBODY SCREEN: CPT

## 2020-05-07 NOTE — ANESTHESIA PRE PROCEDURE
Department of Anesthesiology  Preprocedure Note       Name:  Roxann Johnson   Age:  81 y.o.  :  1935                                          MRN:  [id number]         Date:  2020      Surgeon: Renny Godinez):  MD Leena Blue MD    Procedure: TRANSCATHETER AORTIC VALVE REPLACEMENT FEMORAL APPROACH (N/A )  TRANSCATHETER AORTIC VALVE REPLACEMENT FEMORAL APPROACH (N/A )    Medications prior to admission:   Prior to Admission medications    Medication Sig Start Date End Date Taking? Authorizing Provider   tamsulosin (FLOMAX) 0.4 MG capsule  20   Historical Provider, MD   XARELTO 20 MG TABS tablet TAKE ONE TABLET BY MOUTH DAILY WITH BREAKFAST 2/3/20   Rick Tolentino MD   alfuzosin (UROXATRAL) 10 MG extended release tablet Take 10 mg by mouth daily    Historical Provider, MD   [facility] Natural Products (OSTEO BI-FLEX ADV JOINT SHIELD PO) Take by mouth    Historical Provider, MD   Ibuprofen-diphenhydrAMINE HCl (ADVIL PM) 200-25 MG CAPS Take by mouth as needed     Historical Provider, MD       Current medications:    No current facility-administered medications for this encounter.       Current Outpatient Medications   Medication Sig Dispense Refill    tamsulosin (FLOMAX) 0.4 MG capsule       XARELTO 20 MG TABS tablet TAKE ONE TABLET BY MOUTH DAILY WITH BREAKFAST 30 tablet 11    alfuzosin (UROXATRAL) 10 MG extended release tablet Take 10 mg by mouth daily      Misc Natural Products (OSTEO BI-FLEX ADV JOINT SHIELD PO) Take by mouth      Ibuprofen-diphenhydrAMINE HCl (ADVIL PM) 200-25 MG CAPS Take by mouth as needed          Allergies:  No Known Allergies    Problem List:    Patient Active Problem List   Diagnosis Code    Nonrheumatic aortic valve stenosis I35.0    PAF [facility]oxysmal atrial fibrillation) ([facility]) I48.0    Persistent atrial fibrillation I48.19    Chronic atrial fibrillation I48.20       Past Medical History:        Diagnosis Date    At[facility] [address] [address]     BPH (benign prostatic hyperplasia)        Past Surgical History:        Procedure Laterality Date    HERNIA REPAIR Left     HIP ARTHROPLASTY Left     SHOULDER SURGERY Left        Social History:    Social History     Tobacco Use    Smoking status: Former Smoker     Types: Cigars    Smokeless tobacco: Former User     Types: Chew    Tobacco comment: few cigars when was young. never habit   Substance Use Topics    Alcohol use: Yes     Alcohol/week: 7.0 standard drinks     Types: 7 Cans of beer per week     Drinks per session: 5 or 6                                Counseling given: Not Answered  Comment: few cigars when was young. never habit      Vital Signs (Current): There were no vitals filed for this visit. BP Readings from Last 3 Encounters:   03/05/20 118/64   02/26/20 (!) 189/75   02/06/20 (!) 162/82       NPO Status:                                                                                 BMI:   Wt Readings from Last 3 Encounters:   03/05/20 202 lb (91.6 kg)   02/26/20 203 lb (92.1 kg)   02/06/20 203 lb 1.9 oz (92.1 kg)     There is no height or weight on file to calculate BMI.    CBC:   Lab Results   Component Value Date    WBC 5.5 02/26/2020    RBC 4.51 02/26/2020    HGB 13.8 02/26/2020    HCT 41.6 02/26/2020    MCV 92.2 02/26/2020    RDW 15.6 02/26/2020     02/26/2020       CMP:   Lab Results   Component Value Date     02/26/2020    K 4.5 02/26/2020     02/26/2020    CO2 27 02/26/2020    BUN 19 02/26/2020    CREATININE 0.8 02/26/2020    GFRAA >60 02/26/2020    LABGLOM >60 02/26/2020    GLUCOSE 100 02/26/2020    CALCIUM 9.6 02/26/2020       POC Tests: No results for input(s): POCGLU, POCNA, POCK, POCCL, POCBUN, POCHEMO, POCHCT in the last 72 hours.     Coags: No results found for: PROTIME, INR, APTT    HCG (If Applicable): No results found for: PREGTESTUR, PREGSERUM, HCG, HCGQUANT     ABGs: No results found for: PHART, PO2ART, RFO1PKC, USL2WCG, BEART, A0BPYVDX     Type & Screen (If Applicable):  No results found for: [facility]    Anesthesia Evaluation  Patient summary reviewed and Nursing notes reviewed   history of anesthetic complications: difficult airway. Airway: Mallampati: II  TM distance: >3 FB   Neck ROM: full  Mouth opening: > = 3 FB Dental:          Pulmonary:                              Cardiovascular:  Exercise tolerance: good (>4 METS),   (+) hypertension: moderate, valvular problems/murmurs: AS, dysrhythmias: atrial fibrillation,                ROS comment: ECHO   *Left ventricle - mild concentric LVH, normal size and function with EF of   65%   *MItral valve - calcified, trivial regurgitation   *Aortic valve - severe stenosis with peak velocity of 4.37m/s and mean   gradient of 40mmHg, mild regurgitation   *Tricuspid valve - moderate regurgitation with RVSP of 65mmHg   *Pulmonic valve - mild regurgitation     Atria - enlarged        Neuro/Psych:               GI/Hepatic/Renal:             Endo/Other:                     Abdominal:           Vascular:                                      Anesthesia Plan      MAC     ASA 4     (Hx of difficult intubation)  Induction: intravenous. arterial line  MIPS: Prophylactic antiemetics administered. Anesthetic plan and risks discussed with patient.                     Titi Son MD   5/7/2020

## 2020-05-07 NOTE — LETTER
[address] Cardiology [address] [address]  Phone: [phone]  Fax: [phone]    Mary Beth Tellez MD        May 7, 2020     Chio Atkins, [facility] & [address]    Patient: Luanne Shah  MR Number: [id number]  YOB: 1935  Date of Visit: 5/7/2020    Dear Dr. Chio Atkins:      [address]     H+P // CONSULT // OUTPATIENT VISIT // Alex Benavidez     Referring Doctor Chio Atkins MD   Encounter Type Followup     CHIEF COMPLAINT     Visit Type Chronic   Symptoms SOB increasing   Problems AS, cAFIB     HISTORY OF PRESENT ILLNESS     ? GEN - Doing well. Increased sob with exertion. ? AS - severe by echo today. SOB increasing  ? cAFIB - No palpitations. Tolerating xarelto without bleeding. ? MED - Compliant with CV meds listed below without notable side effects. HISTORY/ALLERGIES/ROS     MedHx:  has a past medical history of Atrial fibrillation (HCC) and BPH (benign prostatic hyperplasia). SurgHx:  has a past surgical history that includes hernia repair (Left); Hip Arthroplasty (Left); and shoulder surgery (Left). SocHx:  reports that he has quit smoking. His smoking use included cigars. He has quit using smokeless tobacco.  His smokeless tobacco use included chew. He reports current alcohol use of about 7.0 standard drinks of alcohol per week. He reports that he does not use drugs. FamHx: family history is not on file. Allerg: Patient has no known allergies.    ROS: [x]Full ROS obtained and negative except as mentioned in HPI     MEDICATIONS      Current Outpatient Medications   Medication Sig Dispense Refill    tamsulosin (FLOMAX) 0.4 MG capsule       XARELTO 20 MG TABS tablet TAKE ONE TABLET BY MOUTH DAILY WITH BREAKFAST 30 tablet 11    alfuzosin (UROXATRAL) 10 MG extended release tablet Take 10 mg by mouth daily  Misc Natural Products (OSTEO BI-FLEX ADV JOINT SHIELD PO) Take by mouth      Ibuprofen-diphenhydrAMINE HCl (ADVIL PM) 200-25 MG CAPS Take by mouth as needed        No current facility-administered medications for this visit. Reviewed with patient and will remain unchanged except as mentioned in A/P  PHYSICAL EXAM     Vitals:    05/07/20 1034   BP: 138/70   Pulse: 56      Gen Alert, coop, no distress Heart  RRR, 3/6   Head NC, AT, no abnorm Abd  Soft, NT, +BS, no mass, no OM   Eyes PER, conj/corn clear Ext  Ext nl, AT, no C/C/E   Nose Nares nl, no drain, NT Pulse 2+ and symmetric   Throat Lips, mucosa, tongue nl Skin Col/text/turg nl, no vis rash/les   Neck S/S, TM, NT, no bruit/JVD Psych Nl mood and affect   Lung CTA-B, unlabored, no DTP Lymph   No cervical or axillary LA   Ch wall NT, no deform Neuro  Nl gross M/S exam     ASSESSMENT AND PLAN      *AS   Date EF Detail   Sx   SOB   NYHA   []I         [x]II           []III          []IV   MPI 7/17 NL Small inferior scar v diaphragm   TTE 6/17 12/18 7/19 2/20 60%  65%  65%  65% Mod AS MG 24, mild AI, mod TR 69  Mod AS MG 32, mild AI, mod TR  Mod AS MG 27, mod TR  Sev AS MG 40, mod TR   LHC 2/20  Normal Cors Erin Olden)   Plan   TAVR next week   *AFIB  Hx Persistent, CVS >1   Status Irregular, on xarelto  Plan Continue xarelto  *COMPLIANCE  Status Compliant  Plan Discussed importance of compliance with meds/diet/salt/exercise; avoid tob/alc/drugs; patient verbalized understanding  *FOLLOWUP  After tavr    Scribe Attestation  IRupinder, am scribing for and in the presence of Shane Lynch MD.   Signed, Rupinder Gomez 05/06/20 2:36 PM   Provider Liliana Alexander is working as a scribe for and in the presence of me (Shane Lynch MD). Working as a scribe, Rupinder Gomez may have prepopulated components of this note with my historical  intellectual property under my direct supervision.   Any additions to this intellectual

## 2020-05-07 NOTE — PROGRESS NOTES
of 14 are permitted in the hospital for the safety of all patients. 15. For your convenience [facility] has a pharmacy on site to fill your prescriptions. If you have any questions between now and surgery, please call [phone]. All above information reviewed with patient in person or by phone. Patient verbalizes understanding. All questions and concerns addressed.                                                                                                                                                                                                                             CVPRE OP INSTRUCTIONS

## 2020-05-09 LAB — SARS-COV-2: NOT DETECTED

## 2020-05-11 NOTE — PROCEDURES
Pulmonary Function Testing      Patient name:  Tim Bah     [facility] Unit #:   [id number]   Date of test:  5/7/2020  Date of interpretation:   5/11/2020    Mr. Tim Bah is a 81y.o. year-old former smoker. The spirometry data were acceptable and reproducible. Spirometry:  Flow volume loops were obstructed. The FEV-1/FVC ratio was decreased. The FEV-1 was 1.53 liters (63% of predicted), which was moderately decreased. The FVC was 2.36 liters (68% of predicted), which was decreased. Response to inhaled bronchodilators (albuterol) was not performed. Lung volumes:  Lung volumes were not tested by plethysmography. Diffusion capacity was not tested. Interpretation:  Moderate obstructive airway disease.     Comments:

## 2020-05-12 ENCOUNTER — ANESTHESIA (OUTPATIENT)
Dept: OPERATING ROOM | Age: 85
DRG: 267 | End: 2020-05-12
Payer: MEDICARE

## 2020-05-12 ENCOUNTER — HOSPITAL ENCOUNTER (INPATIENT)
Age: 85
LOS: 1 days | Discharge: HOME | DRG: 267 | End: 2020-05-13
Attending: INTERNAL MEDICINE | Admitting: INTERNAL MEDICINE
Payer: MEDICARE

## 2020-05-12 VITALS
OXYGEN SATURATION: 98 % | SYSTOLIC BLOOD PRESSURE: 152 MMHG | RESPIRATION RATE: 14 BRPM | DIASTOLIC BLOOD PRESSURE: 72 MMHG

## 2020-05-12 LAB
ABO/RH: NORMAL
ANTIBODY SCREEN: NORMAL
EKG ATRIAL RATE: 52 BPM
EKG DIAGNOSIS: NORMAL
EKG Q-T INTERVAL: 500 MS
EKG QRS DURATION: 138 MS
EKG QTC CALCULATION (BAZETT): 469 MS
EKG R AXIS: 21 DEGREES
EKG T AXIS: 175 DEGREES
EKG VENTRICULAR RATE: 53 BPM
INR BLD: 1.07 (ref 0.86–1.14)
POC ACT LR: 151 SEC
PROTHROMBIN TIME: 12.4 SEC (ref 10–13.2)
TROPONIN: <0.01 NG/ML

## 2020-05-12 PROCEDURE — 84295 ASSAY OF SERUM SODIUM: CPT

## 2020-05-12 PROCEDURE — 86850 RBC ANTIBODY SCREEN: CPT

## 2020-05-12 PROCEDURE — 85014 HEMATOCRIT: CPT

## 2020-05-12 PROCEDURE — 6360000002 HC RX W HCPCS: Performed by: NURSE ANESTHETIST, CERTIFIED REGISTERED

## 2020-05-12 PROCEDURE — 86923 COMPATIBILITY TEST ELECTRIC: CPT

## 2020-05-12 PROCEDURE — 94150 VITAL CAPACITY TEST: CPT

## 2020-05-12 PROCEDURE — 6360000002 HC RX W HCPCS: Performed by: ANESTHESIOLOGY

## 2020-05-12 PROCEDURE — 2140000000 HC CCU INTERMEDIATE R&B

## 2020-05-12 PROCEDURE — 85610 PROTHROMBIN TIME: CPT

## 2020-05-12 PROCEDURE — 84132 ASSAY OF SERUM POTASSIUM: CPT

## 2020-05-12 PROCEDURE — 2500000003 HC RX 250 WO HCPCS: Performed by: NURSE ANESTHETIST, CERTIFIED REGISTERED

## 2020-05-12 PROCEDURE — 85347 COAGULATION TIME ACTIVATED: CPT

## 2020-05-12 PROCEDURE — C1769 GUIDE WIRE: HCPCS

## 2020-05-12 PROCEDURE — 82803 BLOOD GASES ANY COMBINATION: CPT

## 2020-05-12 PROCEDURE — 3700000000 HC ANESTHESIA ATTENDED CARE: Performed by: INTERNAL MEDICINE

## 2020-05-12 PROCEDURE — C1894 INTRO/SHEATH, NON-LASER: HCPCS

## 2020-05-12 PROCEDURE — 2580000003 HC RX 258: Performed by: INTERNAL MEDICINE

## 2020-05-12 PROCEDURE — 2709999900 HC NON-CHARGEABLE SUPPLY: Performed by: INTERNAL MEDICINE

## 2020-05-12 PROCEDURE — 6370000000 HC RX 637 (ALT 250 FOR IP): Performed by: INTERNAL MEDICINE

## 2020-05-12 PROCEDURE — 86900 BLOOD TYPING SEROLOGIC ABO: CPT

## 2020-05-12 PROCEDURE — 51798 US URINE CAPACITY MEASURE: CPT

## 2020-05-12 PROCEDURE — 93005 ELECTROCARDIOGRAM TRACING: CPT | Performed by: INTERNAL MEDICINE

## 2020-05-12 PROCEDURE — 36415 COLL VENOUS BLD VENIPUNCTURE: CPT

## 2020-05-12 PROCEDURE — 2580000003 HC RX 258: Performed by: NURSE ANESTHETIST, CERTIFIED REGISTERED

## 2020-05-12 PROCEDURE — 2709999900 HC NON-CHARGEABLE SUPPLY

## 2020-05-12 PROCEDURE — 2500000003 HC RX 250 WO HCPCS: Performed by: ANESTHESIOLOGY

## 2020-05-12 PROCEDURE — 2720000010 HC SURG SUPPLY STERILE

## 2020-05-12 PROCEDURE — 02RF38Z REPLACEMENT OF AORTIC VALVE WITH ZOOPLASTIC TISSUE, PERCUTANEOUS APPROACH: ICD-10-PCS | Performed by: INTERNAL MEDICINE

## 2020-05-12 PROCEDURE — 83605 ASSAY OF LACTIC ACID: CPT

## 2020-05-12 PROCEDURE — 33361 REPLACE AORTIC VALVE PERQ: CPT | Performed by: INTERNAL MEDICINE

## 2020-05-12 PROCEDURE — 6360000004 HC RX CONTRAST MEDICATION: Performed by: INTERNAL MEDICINE

## 2020-05-12 PROCEDURE — 86901 BLOOD TYPING SEROLOGIC RH(D): CPT

## 2020-05-12 PROCEDURE — 82330 ASSAY OF CALCIUM: CPT

## 2020-05-12 PROCEDURE — 3600000007 HC SURGERY HYBRID BASE

## 2020-05-12 PROCEDURE — 3700000001 HC ADD 15 MINUTES (ANESTHESIA): Performed by: INTERNAL MEDICINE

## 2020-05-12 PROCEDURE — 82947 ASSAY GLUCOSE BLOOD QUANT: CPT

## 2020-05-12 PROCEDURE — 2500000003 HC RX 250 WO HCPCS: Performed by: INTERNAL MEDICINE

## 2020-05-12 PROCEDURE — 6360000002 HC RX W HCPCS

## 2020-05-12 PROCEDURE — 2780000006 HC MISC HEART VALVE

## 2020-05-12 PROCEDURE — 2580000003 HC RX 258: Performed by: THORACIC SURGERY (CARDIOTHORACIC VASCULAR SURGERY)

## 2020-05-12 PROCEDURE — 6360000002 HC RX W HCPCS: Performed by: THORACIC SURGERY (CARDIOTHORACIC VASCULAR SURGERY)

## 2020-05-12 PROCEDURE — C1725 CATH, TRANSLUMIN NON-LASER: HCPCS

## 2020-05-12 PROCEDURE — 6360000002 HC RX W HCPCS: Performed by: INTERNAL MEDICINE

## 2020-05-12 PROCEDURE — 2500000003 HC RX 250 WO HCPCS: Performed by: THORACIC SURGERY (CARDIOTHORACIC VASCULAR SURGERY)

## 2020-05-12 PROCEDURE — C1760 CLOSURE DEV, VASC: HCPCS

## 2020-05-12 PROCEDURE — 84484 ASSAY OF TROPONIN QUANT: CPT

## 2020-05-12 PROCEDURE — 94760 N-INVAS EAR/PLS OXIMETRY 1: CPT

## 2020-05-12 PROCEDURE — 2500000003 HC RX 250 WO HCPCS

## 2020-05-12 PROCEDURE — 3600000017 HC SURGERY HYBRID ADDL 15MIN

## 2020-05-12 PROCEDURE — 93010 ELECTROCARDIOGRAM REPORT: CPT | Performed by: INTERNAL MEDICINE

## 2020-05-12 RX ORDER — OXYCODONE HYDROCHLORIDE AND ACETAMINOPHEN 5; 325 MG/1; MG/1
1 TABLET ORAL ONCE
Status: COMPLETED | OUTPATIENT
Start: 2020-05-12 | End: 2020-05-12

## 2020-05-12 RX ORDER — CLOPIDOGREL BISULFATE 75 MG/1
75 TABLET ORAL DAILY
Status: DISCONTINUED | OUTPATIENT
Start: 2020-05-12 | End: 2020-05-13 | Stop reason: HOSPADM

## 2020-05-12 RX ORDER — ASPIRIN 81 MG/1
81 TABLET ORAL DAILY
Status: DISCONTINUED | OUTPATIENT
Start: 2020-05-12 | End: 2020-05-13 | Stop reason: HOSPADM

## 2020-05-12 RX ORDER — SODIUM CHLORIDE 0.9 % (FLUSH) 0.9 %
10 SYRINGE (ML) INJECTION EVERY 12 HOURS SCHEDULED
Status: DISCONTINUED | OUTPATIENT
Start: 2020-05-12 | End: 2020-05-13 | Stop reason: HOSPADM

## 2020-05-12 RX ORDER — 0.9 % SODIUM CHLORIDE 0.9 %
500 INTRAVENOUS SOLUTION INTRAVENOUS PRN
Status: DISCONTINUED | OUTPATIENT
Start: 2020-05-12 | End: 2020-05-13 | Stop reason: HOSPADM

## 2020-05-12 RX ORDER — SODIUM CHLORIDE 0.9 % (FLUSH) 0.9 %
10 SYRINGE (ML) INJECTION PRN
Status: DISCONTINUED | OUTPATIENT
Start: 2020-05-12 | End: 2020-05-13 | Stop reason: HOSPADM

## 2020-05-12 RX ORDER — ATROPINE SULFATE 0.4 MG/ML
0.5 AMPUL (ML) INJECTION
Status: DISPENSED | OUTPATIENT
Start: 2020-05-12 | End: 2020-05-12

## 2020-05-12 RX ORDER — NITROGLYCERIN 20 MG/100ML
INJECTION INTRAVENOUS CONTINUOUS PRN
Status: DISCONTINUED | OUTPATIENT
Start: 2020-05-12 | End: 2020-05-12 | Stop reason: SDUPTHER

## 2020-05-12 RX ORDER — ONDANSETRON 2 MG/ML
4 INJECTION INTRAMUSCULAR; INTRAVENOUS EVERY 6 HOURS PRN
Status: DISCONTINUED | OUTPATIENT
Start: 2020-05-12 | End: 2020-05-13 | Stop reason: HOSPADM

## 2020-05-12 RX ORDER — HEPARIN SODIUM 1000 [USP'U]/ML
INJECTION, SOLUTION INTRAVENOUS; SUBCUTANEOUS PRN
Status: DISCONTINUED | OUTPATIENT
Start: 2020-05-12 | End: 2020-05-12 | Stop reason: SDUPTHER

## 2020-05-12 RX ORDER — HYDRALAZINE HYDROCHLORIDE 20 MG/ML
10 INJECTION INTRAMUSCULAR; INTRAVENOUS EVERY 6 HOURS PRN
Status: DISCONTINUED | OUTPATIENT
Start: 2020-05-12 | End: 2020-05-13 | Stop reason: HOSPADM

## 2020-05-12 RX ORDER — SODIUM CHLORIDE, SODIUM LACTATE, POTASSIUM CHLORIDE, CALCIUM CHLORIDE 600; 310; 30; 20 MG/100ML; MG/100ML; MG/100ML; MG/100ML
INJECTION, SOLUTION INTRAVENOUS ONCE
Status: COMPLETED | OUTPATIENT
Start: 2020-05-12 | End: 2020-05-12

## 2020-05-12 RX ORDER — TAMSULOSIN HYDROCHLORIDE 0.4 MG/1
0.4 CAPSULE ORAL DAILY
Status: DISCONTINUED | OUTPATIENT
Start: 2020-05-12 | End: 2020-05-13 | Stop reason: HOSPADM

## 2020-05-12 RX ORDER — LIDOCAINE HYDROCHLORIDE 20 MG/ML
INJECTION, SOLUTION EPIDURAL; INFILTRATION; INTRACAUDAL; PERINEURAL PRN
Status: DISCONTINUED | OUTPATIENT
Start: 2020-05-12 | End: 2020-05-12 | Stop reason: SDUPTHER

## 2020-05-12 RX ORDER — LIDOCAINE 4 G/G
1 PATCH TOPICAL ONCE
Status: COMPLETED | OUTPATIENT
Start: 2020-05-12 | End: 2020-05-12

## 2020-05-12 RX ORDER — ALFUZOSIN HYDROCHLORIDE 10 MG/1
10 TABLET, EXTENDED RELEASE ORAL DAILY
Status: DISCONTINUED | OUTPATIENT
Start: 2020-05-12 | End: 2020-05-12 | Stop reason: CLARIF

## 2020-05-12 RX ORDER — SODIUM CHLORIDE, SODIUM LACTATE, POTASSIUM CHLORIDE, CALCIUM CHLORIDE 600; 310; 30; 20 MG/100ML; MG/100ML; MG/100ML; MG/100ML
INJECTION, SOLUTION INTRAVENOUS CONTINUOUS PRN
Status: DISCONTINUED | OUTPATIENT
Start: 2020-05-12 | End: 2020-05-12 | Stop reason: SDUPTHER

## 2020-05-12 RX ORDER — ACETAMINOPHEN 325 MG/1
650 TABLET ORAL EVERY 4 HOURS PRN
Status: DISCONTINUED | OUTPATIENT
Start: 2020-05-12 | End: 2020-05-13 | Stop reason: HOSPADM

## 2020-05-12 RX ORDER — FENTANYL CITRATE 50 UG/ML
INJECTION, SOLUTION INTRAMUSCULAR; INTRAVENOUS PRN
Status: DISCONTINUED | OUTPATIENT
Start: 2020-05-12 | End: 2020-05-12 | Stop reason: SDUPTHER

## 2020-05-12 RX ORDER — NITROGLYCERIN 20 MG/100ML
5 INJECTION INTRAVENOUS CONTINUOUS
Status: DISCONTINUED | OUTPATIENT
Start: 2020-05-12 | End: 2020-05-13 | Stop reason: HOSPADM

## 2020-05-12 RX ORDER — PROPOFOL 10 MG/ML
INJECTION, EMULSION INTRAVENOUS CONTINUOUS PRN
Status: DISCONTINUED | OUTPATIENT
Start: 2020-05-12 | End: 2020-05-12 | Stop reason: SDUPTHER

## 2020-05-12 RX ORDER — PROTAMINE SULFATE 10 MG/ML
INJECTION, SOLUTION INTRAVENOUS PRN
Status: DISCONTINUED | OUTPATIENT
Start: 2020-05-12 | End: 2020-05-12 | Stop reason: SDUPTHER

## 2020-05-12 RX ADMIN — TAMSULOSIN HYDROCHLORIDE 0.4 MG: 0.4 CAPSULE ORAL at 13:34

## 2020-05-12 RX ADMIN — NITROGLYCERIN 5 MCG/MIN: 20 INJECTION INTRAVENOUS at 13:19

## 2020-05-12 RX ADMIN — OXYCODONE HYDROCHLORIDE AND ACETAMINOPHEN 1 TABLET: 5; 325 TABLET ORAL at 14:21

## 2020-05-12 RX ADMIN — FENTANYL CITRATE 25 MCG: 50 INJECTION, SOLUTION INTRAMUSCULAR; INTRAVENOUS at 08:32

## 2020-05-12 RX ADMIN — HEPARIN SODIUM 8000 UNITS: 1000 INJECTION INTRAVENOUS; SUBCUTANEOUS at 09:15

## 2020-05-12 RX ADMIN — Medication 10 ML: at 20:50

## 2020-05-12 RX ADMIN — IOPAMIDOL 107 ML: 755 INJECTION, SOLUTION INTRAVENOUS at 09:34

## 2020-05-12 RX ADMIN — ASPIRIN 81 MG: 81 TABLET, COATED ORAL at 15:11

## 2020-05-12 RX ADMIN — SODIUM CHLORIDE, POTASSIUM CHLORIDE, SODIUM LACTATE AND CALCIUM CHLORIDE: 600; 310; 30; 20 INJECTION, SOLUTION INTRAVENOUS at 07:40

## 2020-05-12 RX ADMIN — FENTANYL CITRATE 25 MCG: 50 INJECTION, SOLUTION INTRAMUSCULAR; INTRAVENOUS at 09:06

## 2020-05-12 RX ADMIN — SODIUM CHLORIDE, POTASSIUM CHLORIDE, SODIUM LACTATE AND CALCIUM CHLORIDE: 600; 310; 30; 20 INJECTION, SOLUTION INTRAVENOUS at 08:22

## 2020-05-12 RX ADMIN — PROPOFOL 50 MCG/KG/MIN: 10 INJECTION, EMULSION INTRAVENOUS at 08:29

## 2020-05-12 RX ADMIN — PROTAMINE SULFATE 50 MG: 10 INJECTION, SOLUTION INTRAVENOUS at 09:33

## 2020-05-12 RX ADMIN — HEPARIN SODIUM 2000 UNITS: 1000 INJECTION INTRAVENOUS; SUBCUTANEOUS at 09:19

## 2020-05-12 RX ADMIN — CLOPIDOGREL 75 MG: 75 TABLET, FILM COATED ORAL at 15:11

## 2020-05-12 RX ADMIN — LIDOCAINE HYDROCHLORIDE 60 MG: 20 INJECTION, SOLUTION EPIDURAL; INFILTRATION; INTRACAUDAL; PERINEURAL at 08:29

## 2020-05-12 RX ADMIN — ACETAMINOPHEN 650 MG: 325 TABLET, FILM COATED ORAL at 22:00

## 2020-05-12 RX ADMIN — FENTANYL CITRATE 25 MCG: 50 INJECTION, SOLUTION INTRAMUSCULAR; INTRAVENOUS at 08:26

## 2020-05-12 RX ADMIN — CEFAZOLIN 1 G: 1 INJECTION, POWDER, FOR SOLUTION INTRAVENOUS at 08:36

## 2020-05-12 RX ADMIN — NITROGLYCERIN 40 MCG/MIN: 20 INJECTION INTRAVENOUS at 08:59

## 2020-05-12 ASSESSMENT — PULMONARY FUNCTION TESTS
PIF_VALUE: 0

## 2020-05-12 ASSESSMENT — PAIN DESCRIPTION - ORIENTATION: ORIENTATION: RIGHT

## 2020-05-12 ASSESSMENT — PAIN SCALES - GENERAL
PAINLEVEL_OUTOF10: 0
PAINLEVEL_OUTOF10: 7
PAINLEVEL_OUTOF10: 0
PAINLEVEL_OUTOF10: 2
PAINLEVEL_OUTOF10: 0
PAINLEVEL_OUTOF10: 9
PAINLEVEL_OUTOF10: 1

## 2020-05-12 ASSESSMENT — PAIN DESCRIPTION - PAIN TYPE
TYPE: ACUTE PAIN
TYPE: ACUTE PAIN

## 2020-05-12 ASSESSMENT — PAIN DESCRIPTION - LOCATION
LOCATION: HIP
LOCATION: HIP

## 2020-05-12 ASSESSMENT — PAIN DESCRIPTION - DESCRIPTORS: DESCRIPTORS: ACHING

## 2020-05-12 NOTE — PROGRESS NOTES
Incentive Spirometry education and demonstration completed by Respiratory Therapy Yes      Response to education: Excellent     Teaching Time: 5 minutes    Minimum Predicted Vital Capacity - 661 mL. Patient's Actual Vital Capacity - 3000 mL. Turning over to Nursing for routine follow-up Yes.     Comments: IS goal met, IS turned to nursing    Electronically signed by Alonso Gupta on 5/12/2020 at 3:49 PM

## 2020-05-12 NOTE — OP NOTE
Operative Note      Patient: Modesto Boss  YOB: 1935  MRN: [id number]    Date of Procedure: 5/12/2020    Pre-Op Diagnosis: Aortic Stenosis    Post-Op Diagnosis: Same       Procedure(s):  Transcatheter aortic valve replacement, femoral approach, with a 26 mm  Fallon S3 bovine pericardial transcatheter bioprosthesis. Surgeon(s):  Carma Repine, MD Donold Osgood, MD     ASSISTANT:  Allyn Garcia MD     ESTIMATED BLOOD LOSS:  Less than 25 mL. Anesthesia: MAC    Complications: None    INDICATIONS:  The patient is a 81year old man who has developed  progressive symptomatology related to a severely stenotic aortic valve. After appropriate evaluation via the transcatheter aortic valve  protocol, he was brought to the hybrid suite today for the purpose of  aortic valve replacement. OPERATIVE PROCEDURE:  After obtaining adequate intravenous sedation and  administration of appropriate preoperative prophylactic antibiotics, the  patient's anterior chest, abdomen, and groins were all meticulously  prepped and draped in a sterile manner. A standard time-out procedure  was completed. Bilateral common femoral arterial and right common femoral venous access  was achieved. A temporary pacing wire was advanced to the apex of the  right ventricle. An aortic root shot was completed to make sure we had  the appropriate deployment angle for the transcatheter valve. Thereafter, the Perclose devices were deployed in the right common  femoral artery and the introducer sheath was placed. The patient was  systemically heparinized. The transcatheter valve was then advanced  through the introducer sheath. Once all present were in agreement, the  transcatheter valve was appropriately located across the aortic annulus. Rapid ventricular pacing was utilized and the valve was deployed. It  seated ideally from the outset. A 26 mm valve was deployed with no  additional inflation.      The

## 2020-05-12 NOTE — PROGRESS NOTES
Pt brought to CVU by cath lab RN. Report given at bedside. Pt connected to monitor. Puncture sites remains soft with no signs or symptoms of bleeding. Assessment and vitals completed. Pt oriented to room and call light. Bed in lowest position, 3/4 side rails up. Right venous sheath and right arterial line remain in place. Will continue to monitor.

## 2020-05-12 NOTE — H&P
81 y.o. here for TAVR. Has had sob, pleural effusions  Past Medical History:   Diagnosis Date    Atrial fibrillation ([address]     BPH (benign prostatic hyperplasia)      Past Surgical History:   Procedure Laterality Date    HERNIA REPAIR Left     HIP ARTHROPLASTY Left     SHOULDER SURGERY Left      Social History     Tobacco Use    Smoking status: Former Smoker     Types: Cigars    Smokeless tobacco: Former User     Types: Chew    Tobacco comment: few cigars when was young. never habit   Substance Use Topics    Alcohol use: Yes     Alcohol/week: 7.0 standard drinks     Types: 7 Cans of beer per week     Drinks per session: 5 or 6    Drug use: No     No Known Allergies    History reviewed. No pertinent family history. PE:  Blood pressure 109/73, pulse 60, temperature 99.4 °F (37.4 °C), temperature source Temporal, resp. rate 16, height 5' 7\" (1.702 m), weight 202 lb 2.6 oz (91.7 kg), SpO2 96 %. General (appearance): Well devel. No acute distress  Eyes: anicteric  Neck: supple  Ears/Nose/Mouth/Thorat: No cyanosis  CV: Irreg and bradycaridc. + MEE   Respiratory:  Lungs clear but diminished at bases. Normal respiratory effort  GI: Abd s/nt/nd. No peritoneal signs  Skin: Warm, dry. No rashes  Neuro/Psych: Alert and oriented x 3. Appropriate behavior  Ext:  No c/c.    Pulses:  2+ fem b    Lab Results   Component Value Date    WBC 3.7 (L) 05/07/2020    HGB 13.8 05/07/2020    HCT 41.6 05/07/2020    MCV 94.0 05/07/2020     05/07/2020     Lab Results   Component Value Date     05/07/2020    K 4.4 05/07/2020     05/07/2020    CO2 27 05/07/2020    BUN 19 05/07/2020    CREATININE 0.8 05/07/2020    GLUCOSE 101 (H) 05/07/2020    CALCIUM 9.5 05/07/2020    PROT 7.3 05/07/2020    LABALBU 4.3 05/07/2020    BILITOT 1.2 (H) 05/07/2020    ALKPHOS 98 05/07/2020    AST 26 05/07/2020    ALT 26 05/07/2020    LABGLOM >60 05/07/2020    GFRAA >60 05/07/2020     Lab Results   Component Value Date    TROPONINI <0.01

## 2020-05-12 NOTE — PROCEDURES
Interventional Cardiology TF-TAVR Operative Report     Physicians Present:  Interventional Cardiology: Dr. Gara Schaumann, Dr. Justin Courtney  Cardiothoracic Surgery: Dr. Nancy Quiñones    NYHA: 2      STS Mortality: 2.23    Procedures:  Temporary transvenous pacemaker placement  Transcutaneous aortic valve replacement (26 mm Mateus S3 Valve)  Peripheral angiography  ____________________    Anesthesia had placed both a peripheral arterial line and peripheral IVs prior to the procedure. Procedure Detail:    The patient was taken to hybrid room and was prepped and draped with sterile surgical technique from neck to knees. Anesthesia induction and maintenance was performed per anesthesia notes. Under fluoroscopic guidance, the right common femoral artery and vein were accessed. A 6 Bengali sheath was placed in the vein, and a 5 Belarusian sheath was placed in the artery. Next, a 5 Fr sheath was placed in the left common femoral artery. All sheaths were flushed after insertion. A temporary pacemaker was placed through the right femoral venous sheath and into the right ventricle. Thresholds were tested and were acceptable. Through the left 5 Fr arterial sheath, a pigtail was advanced over a J wire into the ascending aorta. The J wire was removed, and the catheter was hooked up to a power injector. The catheter was placed in the right coronary cusp. Power injections of the aortic root were performed to align the aortic valve cusps for proper aortic valve deployment imaging. Next, two perclose proglides were deployed in the right common femoral artery using a \"preclose\" technique. After the second perclose was deployed, an 8 Fr sheath was inserted into the femoral artery. Using a 5 Fr JR4 catheter, a J wire was inserted through the 8 Fr femoral arterial sheath and was advanced into the proximal descending aorta. The catheter was then advanced over the wire. The regular J wire was exchanged for an exchange-length Lunderquist wire.

## 2020-05-13 VITALS
DIASTOLIC BLOOD PRESSURE: 69 MMHG | WEIGHT: 202.6 LBS | HEART RATE: 81 BPM | HEIGHT: 67 IN | OXYGEN SATURATION: 95 % | RESPIRATION RATE: 14 BRPM | TEMPERATURE: 98.2 F | BODY MASS INDEX: 31.8 KG/M2 | SYSTOLIC BLOOD PRESSURE: 157 MMHG

## 2020-05-13 LAB
ACTIVATED CLOTTING TIME: 109 SEC (ref 99–130)
ACTIVATED CLOTTING TIME: 202 SEC (ref 99–130)
ACTIVATED CLOTTING TIME: 226 SEC (ref 99–130)
ANION GAP SERPL CALCULATED.3IONS-SCNC: 9 MMOL/L (ref 3–16)
BASE EXCESS ARTERIAL: 1 (ref -3–3)
BUN BLDV-MCNC: 16 MG/DL (ref 7–20)
CALCIUM IONIZED: 1.25 MMOL/L (ref 1.12–1.32)
CALCIUM SERPL-MCNC: 9.1 MG/DL (ref 8.3–10.6)
CHLORIDE BLD-SCNC: 105 MMOL/L (ref 99–110)
CO2: 24 MMOL/L (ref 21–32)
CREAT SERPL-MCNC: 0.8 MG/DL (ref 0.8–1.3)
GFR AFRICAN AMERICAN: >60
GFR NON-AFRICAN AMERICAN: >60
GLUCOSE BLD-MCNC: 95 MG/DL (ref 70–99)
GLUCOSE BLD-MCNC: 98 MG/DL (ref 70–99)
HCO3 ARTERIAL: 27.8 MMOL/L (ref 21–29)
HCT VFR BLD CALC: 39 % (ref 40.5–52.5)
HEMOGLOBIN: 11.6 GM/DL (ref 13.5–17.5)
HEMOGLOBIN: 13.1 G/DL (ref 13.5–17.5)
LACTATE: 0.46 MMOL/L (ref 0.4–2)
LEFT VENTRICULAR EJECTION FRACTION MODE: NORMAL
LV EF: 60 %
LV EF: 60 %
LVEF MODALITY: NORMAL
MCH RBC QN AUTO: 31.4 PG (ref 26–34)
MCHC RBC AUTO-ENTMCNC: 33.5 G/DL (ref 31–36)
MCV RBC AUTO: 93.6 FL (ref 80–100)
O2 SAT, ARTERIAL: 100 % (ref 93–100)
PCO2 ARTERIAL: 57.3 MM HG (ref 35–45)
PDW BLD-RTO: 15.4 % (ref 12.4–15.4)
PERFORMED ON: ABNORMAL
PH ARTERIAL: 7.29 (ref 7.35–7.45)
PLATELET # BLD: 138 K/UL (ref 135–450)
PMV BLD AUTO: 8.3 FL (ref 5–10.5)
PO2 ARTERIAL: 382 MM HG (ref 75–108)
POC HEMATOCRIT: 34 % (ref 40.5–52.5)
POC POTASSIUM: 3.8 MMOL/L (ref 3.5–5.1)
POC SAMPLE TYPE: ABNORMAL
POC SODIUM: 141 MMOL/L (ref 136–145)
POTASSIUM REFLEX MAGNESIUM: 4.2 MMOL/L (ref 3.5–5.1)
RBC # BLD: 4.17 M/UL (ref 4.2–5.9)
SODIUM BLD-SCNC: 138 MMOL/L (ref 136–145)
TCO2 ARTERIAL: 30 MMOL/L
WBC # BLD: 4.6 K/UL (ref 4–11)

## 2020-05-13 PROCEDURE — 2580000003 HC RX 258: Performed by: INTERNAL MEDICINE

## 2020-05-13 PROCEDURE — 80048 BASIC METABOLIC PNL TOTAL CA: CPT

## 2020-05-13 PROCEDURE — 6370000000 HC RX 637 (ALT 250 FOR IP): Performed by: INTERNAL MEDICINE

## 2020-05-13 PROCEDURE — 99239 HOSP IP/OBS DSCHRG MGMT >30: CPT | Performed by: INTERNAL MEDICINE

## 2020-05-13 PROCEDURE — 85027 COMPLETE CBC AUTOMATED: CPT

## 2020-05-13 PROCEDURE — 93306 TTE W/DOPPLER COMPLETE: CPT

## 2020-05-13 RX ORDER — ASPIRIN 81 MG/1
81 TABLET ORAL DAILY
Qty: 30 TABLET | Refills: 3 | Status: SHIPPED | OUTPATIENT
Start: 2020-05-13

## 2020-05-13 RX ADMIN — TAMSULOSIN HYDROCHLORIDE 0.4 MG: 0.4 CAPSULE ORAL at 09:39

## 2020-05-13 RX ADMIN — ASPIRIN 81 MG: 81 TABLET, COATED ORAL at 09:39

## 2020-05-13 RX ADMIN — Medication 10 ML: at 09:39

## 2020-05-13 RX ADMIN — CLOPIDOGREL 75 MG: 75 TABLET, FILM COATED ORAL at 09:39

## 2020-05-13 ASSESSMENT — PAIN SCALES - GENERAL
PAINLEVEL_OUTOF10: 0

## 2020-05-13 NOTE — DISCHARGE SUMMARY
[address]   TAVR DISCHARGE SUMMARY     Patient ID:  Leah Born [id number]  56 y.o.  1935    Admit Date: 5/12/2020  D/C Date:  5/13/2020  Admit MD:  Maxi Johnson MD   Admit Dx:  Nonrheumatic aortic valve stenosis [I35.0]  D/C Dx: Nonrheumatic aortic valve stenosis/SP TAVR  Patient Active Problem List   Diagnosis    Nonrheumatic aortic valve stenosis    PAF (paroxysmal atrial fibrillation) (HCC)    Persistent atrial fibrillation    Chronic atrial fibrillation      D/C Cond:  good  Course:  Admitted for TAVR for severe AS. No apparent complications. Access site(s) stable. Patient denies cp, sob. Consults:  IP CONSULT TO CARDIAC REHAB  Subjective: Patient was seen and examined. Notes, labs, and recent testing reviewed. No acute issues overnight and patient without concern prior to discharge.    Exam:   Gen Alert, coop, no distress Heart  RRR, no MRG, nl apical impulse   Head NC, AT, no abnorm Abd  Soft, NT, +BS, no mass, no OM   Eyes PERRLA, conj/corn clear Ext  Ext nl, AT, no C/C/E   Nose Nares nl, no drain, NT Pulse 2+ and symmetric   Throat Lips, mucosa, tongue nl Skin Color/text/turg nl, no rash/lesions   Neck S/S, TM, NT, no bruit/JVD Psych Nl mood and affect   Lung CTA-B, unlabored, no DTP Lymph   No cervical or axillary LA   Ch wall NT, no deform Neuro  Nl gross M/S exam     Studies/Labs:  Reviewed, please see Epic for specific details  Disposition: home  Discharge Medications:    Guillermo Necessary   Home Medication Instructions [id number]    Printed on:05/13/20 0856   Medication Information                      alfuzosin (UROXATRAL) 10 MG extended release tablet  Take 10 mg by mouth daily             aspirin 81 MG EC tablet  Take 1 tablet by mouth daily             Ibuprofen-diphenhydrAMINE HCl (ADVIL PM) 200-25 MG CAPS  Take by mouth as needed              Misc Natural Products (OSTEO BI-FLEX ADV JOINT SHIELD PO)  Take by mouth             tamsulosin (FLOMAX) 0.4 MG capsule               XARELTO 20 MG TABS tablet  TAKE ONE TABLET BY MOUTH DAILY WITH BREAKFAST                 Summary:  ~Patient is stable from CV standpoint. ASA new for TAVR. Resume Xarelto 5/14/20  ~Tobacco, diet, salt, activity restrictions discussed in detail  [facility] not indicated for AS or for TAVR procedure. Resume only with other indications.      Followup:  ~[facility]:  1 week as scheduled    Signed:  Erlinda Medrano MD, 5/13/2020, 8:56 AM  Time spent on discharge of patient: >31 minutes

## 2020-05-16 LAB
BLOOD BANK DISPENSE STATUS: NORMAL
BLOOD BANK PRODUCT CODE: NORMAL
BPU ID: NORMAL
DESCRIPTION BLOOD BANK: NORMAL

## 2020-05-19 PROBLEM — Z95.2 S/P TAVR (TRANSCATHETER AORTIC VALVE REPLACEMENT): Status: ACTIVE | Noted: 2020-05-19

## 2020-05-21 ENCOUNTER — OFFICE VISIT (OUTPATIENT)
Dept: CARDIOLOGY CLINIC | Age: 85
End: 2020-05-21
Payer: MEDICARE

## 2020-05-21 VITALS
HEIGHT: 67 IN | BODY MASS INDEX: 32.05 KG/M2 | WEIGHT: 204.2 LBS | SYSTOLIC BLOOD PRESSURE: 186 MMHG | OXYGEN SATURATION: 95 % | HEART RATE: 50 BPM | DIASTOLIC BLOOD PRESSURE: 70 MMHG

## 2020-05-21 PROCEDURE — 99214 OFFICE O/P EST MOD 30 MIN: CPT | Performed by: INTERNAL MEDICINE

## 2020-05-21 PROCEDURE — 1036F TOBACCO NON-USER: CPT | Performed by: INTERNAL MEDICINE

## 2020-05-21 PROCEDURE — 93228 REMOTE 30 DAY ECG REV/REPORT: CPT | Performed by: INTERNAL MEDICINE

## 2020-05-21 PROCEDURE — 93000 ELECTROCARDIOGRAM COMPLETE: CPT | Performed by: INTERNAL MEDICINE

## 2020-05-21 PROCEDURE — G8417 CALC BMI ABV UP PARAM F/U: HCPCS | Performed by: INTERNAL MEDICINE

## 2020-05-21 PROCEDURE — 1123F ACP DISCUSS/DSCN MKR DOCD: CPT | Performed by: INTERNAL MEDICINE

## 2020-05-21 PROCEDURE — 4040F PNEUMOC VAC/ADMIN/RCVD: CPT | Performed by: INTERNAL MEDICINE

## 2020-05-21 PROCEDURE — G8427 DOCREV CUR MEDS BY ELIG CLIN: HCPCS | Performed by: INTERNAL MEDICINE

## 2020-05-21 PROCEDURE — 1111F DSCHRG MED/CURRENT MED MERGE: CPT | Performed by: INTERNAL MEDICINE

## 2020-05-21 RX ORDER — HYDROCHLOROTHIAZIDE 25 MG/1
25 TABLET ORAL EVERY MORNING
Qty: 90 TABLET | Refills: 3 | Status: SHIPPED | OUTPATIENT
Start: 2020-05-21 | End: 2021-05-03

## 2020-05-21 NOTE — LETTER
 aspirin 81 MG EC tablet Take 1 tablet by mouth daily 30 tablet 3    tamsulosin (FLOMAX) 0.4 MG capsule       XARELTO 20 MG TABS tablet TAKE ONE TABLET BY MOUTH DAILY WITH BREAKFAST 30 tablet 11    alfuzosin (UROXATRAL) 10 MG extended release tablet Take 10 mg by mouth daily      Misc Natural Products (OSTEO BI-FLEX ADV JOINT SHIELD PO) Take by mouth      Ibuprofen-diphenhydrAMINE HCl (ADVIL PM) 200-25 MG CAPS Take by mouth as needed        No current facility-administered medications for this visit.       Reviewed with patient and will remain unchanged except as mentioned in A/P  PHYSICAL EXAM     Vitals:    05/21/20 1129   BP: (!) 186/70   Pulse:    SpO2:       Gen Alert, coop, no distress Heart  Irreg, petra   Head NC, AT, no abnorm Abd  Soft, NT, +BS, no mass, no OM   Eyes PER, conj/corn clear Ext  Ext nl, AT, no C/C/E   Nose Nares nl, no drain, NT Pulse 2+ and symmetric   Throat Lips, mucosa, tongue nl Skin Col/text/turg nl, no vis rash/les   Neck S/S, TM, NT, no bruit/JVD Psych Nl mood and affect   Lung CTA-B, unlabored, no DTP Lymph   No cervical or axillary LA   Ch wall NT, no deform Neuro  Nl gross M/S exam     ASSESSMENT AND PLAN      *AS   Date EF Detail   Sx   SOB   Hx 5/20  TAVR 26 mm Fallon S3    NYHA   []I         [x]II           []III          []IV   MPI 7/17 NL Small inferior scar v diaphragm   TTE 6/17 12/18 7/19 2/20 5/20 60%  65%  65%  65%  60% Mod AS MG 24, mild AI, mod TR 69  Mod AS MG 32, mild AI, mod TR  Mod AS MG 27, mod TR  Sev AS MG 40, mod TR  TAVR well seated MG 11, mod TR   LHC 2/20  Normal Cors Gerre Meenakshi)   Plan   S/p TAVR and doing well  Echo at 1 month and yearly thereafter   *AFIB  Hx Persistent, CVS >1 , petra noted before and after surgery  Status Irregular, on xarelto  Plan Continue xarelto, 30 day monitor, may need ppm   *COMPLIANCE  Status Compliant  Plan Discussed importance of compliance with meds/diet/salt/exercise; avoid tob/alc/drugs; patient verbalized understanding  *FOLLOWUP  30 days    [address] Brenda Alvarenga, am scribing for and in the presence of Davonte Anders MD.   SignedBrenda 05/19/20 9:39 AM   Provider Leo Michael is working as a scribe for and in the presence of connie Anders MD). Working as a scribe, Brenda Wagner may have prepopulated components of this note with my historical  intellectual property under my direct supervision. Any additions to this intellectual property were performed in my presence and at my direction. Furthermore, the content and accuracy of this note have been reviewed by connie Anders MD).  5/19/2020 9:39 AM      If you have questions, please do not hesitate to call me. I look forward to following Panfilo Salamanca along with you.     Sincerely,        Tiffanie Martinez MD

## 2020-05-27 ENCOUNTER — TELEPHONE (OUTPATIENT)
Dept: CARDIOLOGY CLINIC | Age: 85
End: 2020-05-27

## 2020-06-02 ENCOUNTER — TELEPHONE (OUTPATIENT)
Dept: CARDIOLOGY CLINIC | Age: 85
End: 2020-06-02

## 2020-06-02 NOTE — TELEPHONE ENCOUNTER
Spoke to pts wife. She states that she ordered more patches. States pt is doing fine with no dizziness, LH, near syncope, syncope, SOB. States that he works outside all day and feels well. Instructed to wear the monitor as much as he can until end of service. Verbalized understanding of all and aware to call with any questions/concerns.  Rupal SCHAEFER

## 2020-06-09 ENCOUNTER — TELEPHONE (OUTPATIENT)
Dept: CARDIOLOGY CLINIC | Age: 85
End: 2020-06-09

## 2020-06-09 NOTE — TELEPHONE ENCOUNTER
We received multiple urgent reports on the pt's MCOT. Per RMM, call pt and schedule with EP for DX Marked bradycardia at night. I spoke to [facility] and she advised me to call the pt and offer him an OV with MXA on 7/9/2020 at 9:45 or 11:15. I spoke to the pt's wife with the message above. Both times were offered to the pt and she wanted him to be seen at 9:45 with MXA. The pt also has an OV with DCE the same day at 11:30.

## 2020-06-23 ENCOUNTER — PROCEDURE VISIT (OUTPATIENT)
Dept: CARDIOLOGY CLINIC | Age: 85
End: 2020-06-23
Payer: MEDICARE

## 2020-06-23 LAB
LV EF: 55 %
LVEF MODALITY: NORMAL

## 2020-06-23 PROCEDURE — 93306 TTE W/DOPPLER COMPLETE: CPT | Performed by: INTERNAL MEDICINE

## 2020-07-01 NOTE — PROGRESS NOTES
Take by mouth       No current facility-administered medications for this visit. Reviewed with patient and will remain unchanged except as mentioned in A/P  PHYSICAL EXAM     Vitals:    07/02/20 0916   BP: (!) 158/72   Pulse: 59   SpO2: 97%      Gen Alert, coop, no distress Heart  Rrr, 1/6   Head NC, AT, no abnorm Abd  Soft, NT, +BS, no mass, no OM   Eyes PER, conj/corn clear Ext  Ext nl, AT, no C/C/E   Nose Nares nl, no drain, NT Pulse 2+ and symmetric   Throat Lips, mucosa, tongue nl Skin Col/text/turg nl, no vis rash/les   Neck S/S, TM, NT, no bruit/JVD Psych Nl mood and affect   Lung CTA-B, unlabored, no DTP Lymph   No cervical or axillary LA   Ch wall NT, no deform Neuro  Nl gross M/S exam     ASSESSMENT AND PLAN      *AS   Date EF Detail   Sx   SOB   Hx 5/20  TAVR 26 mm Fallon S3    NYHA   II   MPI 7/17 NL Small inferior scar v diaphragm   TTE 6/17 12/18 7/19 2/20 5/20 60%  65%  65%  65%  60% Mod AS MG 24, mild AI, mod TR 69  Mod AS MG 32, mild AI, mod TR  Mod AS MG 27, mod TR  Sev AS MG 40, mod TR  TAVR well seated MG 11, mod TR   LHC 2/20  Normal Cors (Cassandra)   Plan   S/p TAVR and doing well  Echo yearly   *AFIB  Hx Persistent, CVS >1 , raoul noted before and after surgery, MCOT 6/20-> Raoul   Status Irregular, on xarelto  Plan Continue xarelto   FU with EP as scheduled, pacer per EP  *COMPLIANCE  Status Compliant  Plan Discussed importance of compliance with meds/diet/salt/exercise; avoid tob/alc/drugs; patient verbalized understanding  *FOLLOWUP  3 months    [address] Shane Alvarenga, am scribing for and in the presence of Sandra Alcala MD.   Shane Parisi 07/01/20 8:35 AM   Provider Arjun Hare is working as a scribe for and in the presence of me (Sandra Alcala MD). Working as a scribe, Shane Sahu may have prepopulated components of this note with my historical  intellectual property under my direct supervision.   Any additions to this intellectual property were performed in my presence and at my direction.   Furthermore, the content and accuracy of this note have been reviewed by me Tuyet Huggins MD).  7/2/2020 9:16 AM

## 2020-07-02 ENCOUNTER — OFFICE VISIT (OUTPATIENT)
Dept: CARDIOLOGY CLINIC | Age: 85
End: 2020-07-02
Payer: MEDICARE

## 2020-07-02 VITALS
OXYGEN SATURATION: 97 % | BODY MASS INDEX: 31.01 KG/M2 | WEIGHT: 198 LBS | DIASTOLIC BLOOD PRESSURE: 72 MMHG | HEART RATE: 59 BPM | SYSTOLIC BLOOD PRESSURE: 158 MMHG

## 2020-07-02 PROCEDURE — 4040F PNEUMOC VAC/ADMIN/RCVD: CPT | Performed by: INTERNAL MEDICINE

## 2020-07-02 PROCEDURE — 1036F TOBACCO NON-USER: CPT | Performed by: INTERNAL MEDICINE

## 2020-07-02 PROCEDURE — G8417 CALC BMI ABV UP PARAM F/U: HCPCS | Performed by: INTERNAL MEDICINE

## 2020-07-02 PROCEDURE — 99214 OFFICE O/P EST MOD 30 MIN: CPT | Performed by: INTERNAL MEDICINE

## 2020-07-02 PROCEDURE — 1123F ACP DISCUSS/DSCN MKR DOCD: CPT | Performed by: INTERNAL MEDICINE

## 2020-07-02 PROCEDURE — G8427 DOCREV CUR MEDS BY ELIG CLIN: HCPCS | Performed by: INTERNAL MEDICINE

## 2020-07-02 PROCEDURE — 99205 OFFICE O/P NEW HI 60 MIN: CPT | Performed by: INTERNAL MEDICINE

## 2020-07-02 PROCEDURE — G8428 CUR MEDS NOT DOCUMENT: HCPCS | Performed by: INTERNAL MEDICINE

## 2020-07-02 NOTE — LETTER
 hydroCHLOROthiazide (HYDRODIURIL) 25 MG tablet Take 1 tablet by mouth every morning 90 tablet 3    aspirin 81 MG EC tablet Take 1 tablet by mouth daily 30 tablet 3    XARELTO 20 MG TABS tablet TAKE ONE TABLET BY MOUTH DAILY WITH BREAKFAST 30 tablet 11    alfuzosin (UROXATRAL) 10 MG extended release tablet Take 10 mg by mouth daily      Misc Natural Products (OSTEO BI-FLEX ADV JOINT SHIELD PO) Take by mouth       No current facility-administered medications for this visit.       Reviewed with patient and will remain unchanged except as mentioned in A/P  PHYSICAL EXAM     Vitals:    07/02/20 0916   BP: (!) 158/72   Pulse: 59   SpO2: 97%      Gen Alert, coop, no distress Heart  Rrr, 1/6   Head NC, AT, no abnorm Abd  Soft, NT, +BS, no mass, no OM   Eyes PER, conj/corn clear Ext  Ext nl, AT, no C/C/E   Nose Nares nl, no drain, NT Pulse 2+ and symmetric   Throat Lips, mucosa, tongue nl Skin Col/text/turg nl, no vis rash/les   Neck S/S, TM, NT, no bruit/JVD Psych Nl mood and affect   Lung CTA-B, unlabored, no DTP Lymph   No cervical or axillary LA   Ch wall NT, no deform Neuro  Nl gross M/S exam     ASSESSMENT AND PLAN      *AS   Date EF Detail   Sx   SOB   Hx 5/20  TAVR 26 mm Fallon S3    NYHA   II   MPI 7/17 NL Small inferior scar v diaphragm   TTE 6/17 12/18 7/19 2/20 5/20 60%  65%  65%  65%  60% Mod AS MG 24, mild AI, mod TR 69  Mod AS MG 32, mild AI, mod TR  Mod AS MG 27, mod TR  Sev AS MG 40, mod TR  TAVR well seated MG 11, mod TR   LHC 2/20  Normal Cors (Cassandra)   Plan   S/p TAVR and doing well  Echo yearly   *AFIB  Hx Persistent, CVS >1 , petra noted before and after surgery, MCOT 6/20-> Petra   Status Irregular, on xarelto  Plan Continue xarelto   FU with EP as scheduled, pacer per EP  *COMPLIANCE  Status Compliant  Plan Discussed importance of compliance with meds/diet/salt/exercise; avoid tob/alc/drugs; patient verbalized understanding  *FOLLOWUP  3 months    [facility], Flaco Plasencia, am scribing for and in the presence of Virginia Pedro MD.   Signed, Flaco Plasencia 07/01/20 8:35 AM   Provider Indira Rodriguez is working as a scribe for and in the presence of connie Pedro MD). Working as a scribe, Flaco Plasencia may have prepopulated components of this note with my historical  intellectual property under my direct supervision. Any additions to this intellectual property were performed in my presence and at my direction. Furthermore, the content and accuracy of this note have been reviewed by connie Pedro MD).  7/2/2020 9:16 AM        If you have questions, please do not hesitate to call me. I look forward to following Ashleigh Martinez along with you.     Sincerely,        Kelsie Fatima MD

## 2020-07-02 NOTE — PROGRESS NOTES
[facility]   Electrophysiology Consultation   Date: 7/2/2020  Reason for Consultation:   Consult Requesting Physician: Arianne Bailey MD      No chief complaint on file. HPI: Anne Marie Zhou is a 81 y.o. male with permanent Atrial fibrillation, s/p TAVR 5/2020, noted to have bradycardia while in hospital.  He has intermittent LBBB. A monitor showed HR down to 30's. He has some fatigue and shortness of breath with activity. Past Medical History:   Diagnosis Date    Atrial fibrillation ([address]     BPH (benign prostatic hyperplasia)         Past Surgical History:   Procedure Laterality Date    AORTIC VALVE REPLACEMENT N/A 5/12/2020    TRANSCATHETER AORTIC VALVE REPLACEMENT FEMORAL APPROACH performed by Nahum Harris MD at [address] [place] [address],[address] N/A 5/12/2020    TRANSCATHETER AORTIC VALVE REPLACEMENT FEMORAL APPROACH performed by Harjit Garcia MD at [address] Left     HIP ARTHROPLASTY Left     SHOULDER SURGERY Left        Allergies:  No Known Allergies    Social History:   reports that he has quit smoking. His smoking use included cigars. He has quit using smokeless tobacco.  His smokeless tobacco use included chew. He reports current alcohol use of about 7.0 standard drinks of alcohol per week. He reports that he does not use drugs. Family History:     Reviewed. Denies family history of sudden cardiac death, arrhythmia, premature CAD    Review of System:  All other systems reviewed and are negative except for that noted above.  Pertinent negatives are:   General: negative for fever, chills   Ophthalmic ROS: negative for - eye pain or loss of vision  ENT ROS: negative for - headaches, sore throat   Respiratory: negative for - cough, sputum  Cardiovascular: Reviewed in HPI  Gastrointestinal: negative for - abdominal pain, diarrhea, N/V  Hematology: negative for - bleeding, blood clots, bruising or jaundice  Genito-Urinary:  negative for - Dysuria or incontinence  Musculoskeletal: negative for - Joint swelling, muscle pain  Neurological: negative for - confusion, dizziness, headaches   Psychiatric: No anxiety, no depression. Dermatological: negative for - rash    Physical Examination:  There were no vitals filed for this visit. Wt Readings from Last 3 Encounters:   07/02/20 198 lb (89.8 kg)   05/21/20 204 lb 3.2 oz (92.6 kg)   05/13/20 202 lb 9.6 oz (91.9 kg)       · Constitutional: Oriented. No distress. · Head: Normocephalic and atraumatic. · Mouth/Throat: Oropharynx is clear and moist.   · Eyes: Conjunctivae normal. EOM are normal.   · Neck: Neck supple. No rigidity. No JVD present. · Cardiovascular: Normal rate, irregular rhythm, S1&S2. · Pulmonary/Chest: Bilateral respiratory sounds. No wheezes, No rhonchi. · Abdominal: Soft. Bowel sounds present. No distension, No tenderness. · Musculoskeletal: No tenderness. No edema    · Lymphadenopathy: Has no cervical adenopathy. · Neurological: Alert and oriented. Cranial nerve appears intact, No Gross deficit   · Skin: Skin is warm and dry. No rash noted. · Psychiatric: Has a normal behavior       Labs, diagnostic and imaging results reviewed. Reviewed.      ECG:    Echo: Conclusions      Summary   *Left ventricle - normal size, moderate concentric LVH, normal function with   EF of 73%   *Diastolic dysfunction with elevated filling pressures   *Mitral valve - annular calcification, calcified, mild regurgitation   *Aortic valve - TAVR valve well seated with mean gradient of 11mmHg   *Tricuspid valve - moderate regurgitation with PASP fo 65mmHg   *Pulmonic valve - mild regurgitation   *Left atrium - mildly dilated  Cath: Findings:          LM                   Normal  LAD                 Normal  CX                   Normal  RCA                 Normal  LVG                 NA  LVEDP            NA              Medication:  Current Outpatient Medications   Medication Sig Dispense Refill  hydroCHLOROthiazide (HYDRODIURIL) 25 MG tablet Take 1 tablet by mouth every morning 90 tablet 3    aspirin 81 MG EC tablet Take 1 tablet by mouth daily 30 tablet 3    XARELTO 20 MG TABS tablet TAKE ONE TABLET BY MOUTH DAILY WITH BREAKFAST 30 tablet 11    alfuzosin (UROXATRAL) 10 MG extended release tablet Take 10 mg by mouth daily      Misc Natural Products (OSTEO BI-FLEX ADV JOINT SHIELD PO) Take by mouth       No current facility-administered medications for this visit. Assessment and plan:     - Symptomatic bradycardia     Given symptoms and low HR, a Permanent pacemaker is indicated. Option of single chamber vs leadless were discussed    The risks, benefits and alternatives of the procedure were discussed with the patient. The risks including, but not limited to, the risks of bleeding, infection, pain, device malfunction, lead dislodgement, radiation exposure, injury to cardiac and surrounding structures (including pneumothorax), stroke, cardiac perforation, tamponade, need for emergent heart surgery, myocardial infarction and death were discussed in detail. Printed information about ablation including risks were given. Patient was encouraged to review information given, and call back with any questions about risks, benefits and alternative of procedure. The patient opted to proceed with the device implantation.     - Permanent Atrial fibrillation   On coumadin    - AS, s/p TAVR   Doing fine     Plan  Micra pacemaker      I independently reviewed  OT      Thank you for allowing me to participate in the care of Mordecai Dandy. Further evaluation will be based upon the patient's clinical course and testing results. All questions and concerns were addressed to the patient/family. Alternatives to my treatment were discussed. I have discussed the above stated plan and the patient verbalized understanding and agreed with the plan.     NOTE: This report was transcribed using voice recognition software. Every effort was made to ensure accuracy, however, inadvertent computerized transcription errors may be present.        Marino Jack MD, [facility], [address] [facility]   Office: [phone]   I, Dr. Marino Jack personally performed the services described in this documentation as scribed by RN in my presence, and it is both accurate and complete.

## 2020-07-02 NOTE — LETTER
[facility]  EP Procedure Sheet    7/2/20  Jos Clover  1935  EP Procedures    xxx Micra pacemaker  EP Study    ICD implant (single/dual)  Atrial flutter ablation (MONTY Y/N)    Biv implant ICD  Tilt Table    Biv implant PPM  Atrial fibrillation ablation (MONTY Yes)    Generator Change (PPM/ICD/BiV)  SVT ablation    Lead revision (RV/LA/RA) (<1 month)  VT ablation      Lead extraction +/- upgrade (BiV/PPM/ICD)  VT Ischemic/ non-ischemic    Loop implant/ removal  VT RVOT    Cardioversion  VT Left sided    MONTY  AVN ablation     Equipment    xxx Medtronic   CARLY Mapping System    [employer]  Carto Mapping System    [employer]  CryoAblation    Biotronik  Laser Lead Extraction     EP Procedures Scheduling Request    # hours Requested   Scheduled  Date:   Specific Day  Completed    Anesthesia  F/u Date:   CT surgery backup      Overnight stay ???     Location [facility]       Pre-Procedure Labs / Imaging     PT/INR  Type & cross    CBC  Units PRBC    BMP/Mg  Units FFP    Venogram  CXR    Echo  Cardiac CTA for Pulmonary vein mapping     RN INITIALS: LS    Patient Instructions  Do not eat or drink after midnight the night prior to procedure  Dx: pauses  Hold Xarelto for 3 day/s prior

## 2020-07-21 ENCOUNTER — OFFICE VISIT (OUTPATIENT)
Dept: PRIMARY CARE CLINIC | Age: 85
End: 2020-07-21
Payer: MEDICARE

## 2020-07-21 PROCEDURE — 99211 OFF/OP EST MAY X REQ PHY/QHP: CPT | Performed by: NURSE PRACTITIONER

## 2020-07-21 NOTE — PATIENT INSTRUCTIONS
Steps to help prevent the spread of COVID-19 if you are sick  SOURCE - https://garcia-ko.info/. html     Stay home except to get medical care   ; Stay home: People who are mildly ill with COVID-19 are able to isolate at home during their illness. You should restrict activities outside your home, except for getting medical care.   ; Avoid public areas: Do not go to work, school, or public areas.   ; Avoid public transportation: Avoid using public transportation, ride-sharing, or taxis.  ; Separate yourself from other people and animals in your home   ; Stay away from others: As much as possible, you should stay in a specific room and away from other people in your home. Also, you should use a separate bathroom, if available.   ; Limit contact with pets & animals: You should restrict contact with pets and other animals while you are sick with COVID-19, just like you would around other people. Although there have not been reports of pets or other animals becoming sick with COVID-19, it is still recommended that people sick with COVID-19 limit contact with animals until more information is known about the virus. ; When possible, have another member of your household care for your animals while you are sick. If you are sick with COVID-19, avoid contact with your pet, including petting, snuggling, being kissed or licked, and sharing food. If you must care for your pet or be around animals while you are sick, wash your hands before and after you interact with pets and wear a facemask. See COVID-19 and Animals for more information. Other considerations   The ill person should eat/be fed in their room if possible. Non-disposable  items used should be handled with gloves and washed with hot water or in a . Clean hands after handling used  items.  If possible, dedicate a lined trash can for the ill person.  Use gloves when removing garbage bags, handling, and disposing of trash. Wash hands after handling or disposing of trash.  Consider consulting with your local health department about trash disposal guidance if available. Information for Household Members and Caregivers of Someone who is Sick   Call ahead before visiting your doctor   Call ahead: If you have a medical appointment, call the healthcare provider and tell them that you have or may have COVID-19. This will help the healthcare provider's office take steps to keep other people from getting infected or exposed. Wear a facemask if you are sick   ; If you are sick: You should wear a facemask when you are around other people (e.g., sharing a room or vehicle) or pets and before you enter a healthcare provider's office. ; If you are caring for others: If the person who is sick is not able to wear a facemask (for example, because it causes trouble breathing), then people who live with the person who is sick should not stay in the same room with them, or they should wear a facemask if they enter a room with the person who is sick. Cover your coughs and sneezes   ; Cover: Cover your mouth and nose with a tissue when you cough or sneeze.   ; Dispose: Throw used tissues in a lined trash can.   ; Wash hands: Immediately wash your hands with soap and water for at least 20 seconds or, if soap and water are not available, clean your hands with an alcohol-based hand  that contains at least 60% alcohol. Clean your hands often   ;  Wash hands: Wash your hands often with soap and water for at least 20 seconds, especially after blowing your nose, coughing, or sneezing; going to the bathroom; and before eating or preparing food.   ; Hand : If soap and water are not readily available, use an alcohol-based hand  with at least 60% alcohol, covering all surfaces of your hands and rubbing them together until they feel dry.   ; Soap and water: Soap and water are the best option if hands are visibly dirty.   ; Avoid touching: Avoid touching your eyes, nose, and mouth with unwashed hands. Handwashing Tips   ; Wet your hands with clean, running water (warm or cold), turn off the tap, and apply soap.  ; Lather your hands by rubbing them together with the soap. Lather the backs of your hands, between your fingers, and under your nails. ; Scrub your hands for at least 20 seconds. Need a timer? Hum the [place] from beginning to end twice.  ; Rinse your hands well under clean, running water.  ; Dry your hands using a clean towel or air dry them. Avoid sharing personal household items   ; Do not share: You should not share dishes, drinking glasses, cups, eating utensils, towels, or bedding with other people or pets in your home.   ; Wash thoroughly after use: After using these items, they should be washed thoroughly with soap and water. Clean all high-touch surfaces everyday   ; Clean and disinfect: Practice routine cleaning of high touch surfaces.  ; High touch surfaces include counters, tabletops, doorknobs, bathroom fixtures, toilets, phones, keyboards, tablets, and bedside tables.  ; Disinfect areas with bodily fluids: Also, clean any surfaces that may have blood, stool, or body fluids on them.   ; Household : Use a household cleaning spray or wipe, according to the label instructions. Labels contain instructions for safe and effective use of the cleaning product including precautions you should take when applying the product, such as wearing gloves and making sure you have good ventilation during use of the product.     Monitor your symptoms   Seek medical attention: Seek prompt medical attention if your illness is worsening     (e.g., difficulty breathing).   ; Call your doctor: Before seeking care, call your healthcare provider and tell them that you have, or are being evaluated for, COVID-19.   ; Wear a facemask when sick: Put on a facemask before you enter the facility. These steps will help the healthcare provider's office to keep other people in the office or waiting room from getting infected or exposed. ; Alert health department: Ask your healthcare provider to call the local or state health department. Persons who are placed under active monitoring or facilitated self-monitoring should follow instructions provided by their local health department or occupational health professionals, as appropriate.  ; Call 911 if you have a medical emergency: If you have a medical emergency and need to call 911, notify the dispatch personnel that you have, or are being evaluated for COVID-19. If possible, put on a facemask before emergency medical services arrive. Thank you for enrolling in [facility]. Please follow the instructions below to securely access your online medical record. [employer] allows you to send messages to your doctor, view your test results, renew your prescriptions, schedule appointments, and more. How Do I Sign Up? 1. In your Internet browser, go to [email or web address]  2. Click on the Sign Up Now link in the Sign In box. You will see the New Member Sign Up page. 3. Enter your [employer] Access Code exactly as it appears below. You will not need to use this code after youve completed the sign-up process. If you do not sign up before the expiration date, you must request a new code. MyChart Access Code: Activation code not generated  Current [employer] Status: Patient Declined    4. Enter your Social Security Number ([id number]) and Date of Birth (mm/dd/yyyy) as indicated and click Submit. You will be taken to the next sign-up page. 5. Create a [employer] ID. This will be your [employer] login ID and cannot be changed, so think of one that is secure and easy to remember. 6. Create a [employer] password. You can change your password at any time. 7. Enter your Password Reset Question and Answer.  This can be used at a later time if you forget your password. 8. Enter your e-mail address. You will receive e-mail notification when new information is available in [address] 9. Click Sign Up. You can now view your medical record. Additional Information  If you have questions, please contact your physician practice where you receive care. Remember, MyChart is NOT to be used for urgent needs. For medical emergencies, dial 911.

## 2020-07-27 LAB
SARS-COV-2: NOT DETECTED
SOURCE: NORMAL

## 2020-07-28 ENCOUNTER — HOSPITAL ENCOUNTER (OUTPATIENT)
Dept: CARDIAC CATH/INVASIVE PROCEDURES | Age: 85
Discharge: HOME | End: 2020-07-29
Attending: INTERNAL MEDICINE | Admitting: INTERNAL MEDICINE
Payer: MEDICARE

## 2020-07-28 PROBLEM — R00.1 BRADYCARDIA: Status: ACTIVE | Noted: 2020-07-28

## 2020-07-28 PROBLEM — Z95.0 PACEMAKER: Status: ACTIVE | Noted: 2020-07-28

## 2020-07-28 PROBLEM — I48.0 PAF (PAROXYSMAL ATRIAL FIBRILLATION) (HCC): Status: RESOLVED | Noted: 2018-07-17 | Resolved: 2020-07-28

## 2020-07-28 PROBLEM — I44.7 LBBB (LEFT BUNDLE BRANCH BLOCK): Status: ACTIVE | Noted: 2020-07-28

## 2020-07-28 LAB
ANION GAP SERPL CALCULATED.3IONS-SCNC: 12 MMOL/L (ref 3–16)
BUN BLDV-MCNC: 22 MG/DL (ref 7–20)
CALCIUM SERPL-MCNC: 9.3 MG/DL (ref 8.3–10.6)
CHLORIDE BLD-SCNC: 101 MMOL/L (ref 99–110)
CO2: 27 MMOL/L (ref 21–32)
CREAT SERPL-MCNC: 0.9 MG/DL (ref 0.8–1.3)
EKG ATRIAL RATE: 65 BPM
EKG DIAGNOSIS: NORMAL
EKG Q-T INTERVAL: 436 MS
EKG QRS DURATION: 114 MS
EKG QTC CALCULATION (BAZETT): 393 MS
EKG R AXIS: 25 DEGREES
EKG T AXIS: 34 DEGREES
EKG VENTRICULAR RATE: 49 BPM
GFR AFRICAN AMERICAN: >60
GFR NON-AFRICAN AMERICAN: >60
GLUCOSE BLD-MCNC: 111 MG/DL (ref 70–99)
HCT VFR BLD CALC: 39.7 % (ref 40.5–52.5)
HEMOGLOBIN: 13.2 G/DL (ref 13.5–17.5)
MCH RBC QN AUTO: 31.1 PG (ref 26–34)
MCHC RBC AUTO-ENTMCNC: 33.3 G/DL (ref 31–36)
MCV RBC AUTO: 93.4 FL (ref 80–100)
PDW BLD-RTO: 15.2 % (ref 12.4–15.4)
PLATELET # BLD: 176 K/UL (ref 135–450)
PMV BLD AUTO: 7.5 FL (ref 5–10.5)
POTASSIUM SERPL-SCNC: 4 MMOL/L (ref 3.5–5.1)
RBC # BLD: 4.25 M/UL (ref 4.2–5.9)
SODIUM BLD-SCNC: 140 MMOL/L (ref 136–145)
WBC # BLD: 3.5 K/UL (ref 4–11)

## 2020-07-28 PROCEDURE — C1894 INTRO/SHEATH, NON-LASER: HCPCS

## 2020-07-28 PROCEDURE — 99153 MOD SED SAME PHYS/QHP EA: CPT

## 2020-07-28 PROCEDURE — 2580000003 HC RX 258: Performed by: INTERNAL MEDICINE

## 2020-07-28 PROCEDURE — 99152 MOD SED SAME PHYS/QHP 5/>YRS: CPT | Performed by: INTERNAL MEDICINE

## 2020-07-28 PROCEDURE — 36415 COLL VENOUS BLD VENIPUNCTURE: CPT

## 2020-07-28 PROCEDURE — 6360000002 HC RX W HCPCS

## 2020-07-28 PROCEDURE — 2500000003 HC RX 250 WO HCPCS

## 2020-07-28 PROCEDURE — 93005 ELECTROCARDIOGRAM TRACING: CPT | Performed by: INTERNAL MEDICINE

## 2020-07-28 PROCEDURE — 85027 COMPLETE CBC AUTOMATED: CPT

## 2020-07-28 PROCEDURE — C1760 CLOSURE DEV, VASC: HCPCS

## 2020-07-28 PROCEDURE — C1769 GUIDE WIRE: HCPCS

## 2020-07-28 PROCEDURE — 93010 ELECTROCARDIOGRAM REPORT: CPT | Performed by: INTERNAL MEDICINE

## 2020-07-28 PROCEDURE — 33999 UNLISTED PX CARDIAC SURGERY: CPT

## 2020-07-28 PROCEDURE — C1786 PMKR, SINGLE, RATE-RESP: HCPCS

## 2020-07-28 PROCEDURE — 2709999900 HC NON-CHARGEABLE SUPPLY

## 2020-07-28 PROCEDURE — 2720000010 HC SURG SUPPLY STERILE

## 2020-07-28 PROCEDURE — 80048 BASIC METABOLIC PNL TOTAL CA: CPT

## 2020-07-28 PROCEDURE — 99152 MOD SED SAME PHYS/QHP 5/>YRS: CPT

## 2020-07-28 PROCEDURE — 2580000003 HC RX 258

## 2020-07-28 PROCEDURE — 6370000000 HC RX 637 (ALT 250 FOR IP): Performed by: INTERNAL MEDICINE

## 2020-07-28 PROCEDURE — 33274 TCAT INSJ/RPL PERM LDLS PM: CPT | Performed by: INTERNAL MEDICINE

## 2020-07-28 RX ORDER — ONDANSETRON 2 MG/ML
4 INJECTION INTRAMUSCULAR; INTRAVENOUS EVERY 6 HOURS PRN
Status: DISCONTINUED | OUTPATIENT
Start: 2020-07-28 | End: 2020-07-29 | Stop reason: HOSPADM

## 2020-07-28 RX ORDER — TAMSULOSIN HYDROCHLORIDE 0.4 MG/1
0.4 CAPSULE ORAL DAILY
Status: DISCONTINUED | OUTPATIENT
Start: 2020-07-28 | End: 2020-07-29 | Stop reason: HOSPADM

## 2020-07-28 RX ORDER — ASPIRIN 81 MG/1
81 TABLET ORAL DAILY
Status: DISCONTINUED | OUTPATIENT
Start: 2020-07-29 | End: 2020-07-29 | Stop reason: HOSPADM

## 2020-07-28 RX ORDER — HYDROCHLOROTHIAZIDE 25 MG/1
25 TABLET ORAL EVERY MORNING
Status: DISCONTINUED | OUTPATIENT
Start: 2020-07-29 | End: 2020-07-29 | Stop reason: HOSPADM

## 2020-07-28 RX ORDER — TAMSULOSIN HYDROCHLORIDE 0.4 MG/1
0.4 CAPSULE ORAL DAILY
Status: DISCONTINUED | OUTPATIENT
Start: 2020-07-29 | End: 2020-07-28 | Stop reason: ALTCHOICE

## 2020-07-28 RX ORDER — SODIUM CHLORIDE 0.9 % (FLUSH) 0.9 %
10 SYRINGE (ML) INJECTION EVERY 12 HOURS SCHEDULED
Status: DISCONTINUED | OUTPATIENT
Start: 2020-07-28 | End: 2020-07-29 | Stop reason: HOSPADM

## 2020-07-28 RX ORDER — ACETAMINOPHEN 325 MG/1
650 TABLET ORAL EVERY 4 HOURS PRN
Status: DISCONTINUED | OUTPATIENT
Start: 2020-07-28 | End: 2020-07-29 | Stop reason: HOSPADM

## 2020-07-28 RX ORDER — SODIUM CHLORIDE 0.9 % (FLUSH) 0.9 %
10 SYRINGE (ML) INJECTION PRN
Status: DISCONTINUED | OUTPATIENT
Start: 2020-07-28 | End: 2020-07-29 | Stop reason: HOSPADM

## 2020-07-28 RX ORDER — ONDANSETRON 2 MG/ML
INJECTION INTRAMUSCULAR; INTRAVENOUS
Status: COMPLETED
Start: 2020-07-28 | End: 2020-07-28

## 2020-07-28 RX ADMIN — TAMSULOSIN HYDROCHLORIDE 0.4 MG: 0.4 CAPSULE ORAL at 22:38

## 2020-07-28 RX ADMIN — Medication 10 ML: at 20:51

## 2020-07-28 RX ADMIN — RIVAROXABAN 20 MG: 20 TABLET, FILM COATED ORAL at 18:07

## 2020-07-28 RX ADMIN — ONDANSETRON: 2 INJECTION INTRAMUSCULAR; INTRAVENOUS at 17:36

## 2020-07-28 NOTE — H&P
[address]   Electrophysiology      Reason for Consultation:   Consult Requesting Physician: Melinda Becerril MD      No chief complaint on file. HPI: Terence London is a 81 y.o. male with permanent Atrial fibrillation, s/p TAVR 5/2020, noted to have bradycardia while in hospital.  He has intermittent LBBB. A monitor showed HR down to 30's. He has some fatigue and shortness of breath with activity. Past Medical History:   Diagnosis Date    Atrial fibrillation ([address]     BPH (benign prostatic hyperplasia)         Past Surgical History:   Procedure Laterality Date    AORTIC VALVE REPLACEMENT N/A 5/12/2020    TRANSCATHETER AORTIC VALVE REPLACEMENT FEMORAL APPROACH performed by Antonieta Cason MD at [address] [facility],[address] N/A 5/12/2020    TRANSCATHETER AORTIC VALVE REPLACEMENT FEMORAL APPROACH performed by Jack Carl MD at [address]     HIP ARTHROPLASTY Left     SHOULDER SURGERY Left        Allergies:  No Known Allergies    Social History:   reports that he has quit smoking. His smoking use included cigars. He has quit using smokeless tobacco.  His smokeless tobacco use included chew. He reports current alcohol use of about 7.0 standard drinks of alcohol per week. He reports that he does not use drugs. Family History:     Reviewed. Denies family history of sudden cardiac death, arrhythmia, premature CAD    Review of System:  All other systems reviewed and are negative except for that noted above.  Pertinent negatives are:   General: negative for fever, chills   Ophthalmic ROS: negative for - eye pain or loss of vision  ENT ROS: negative for - headaches, sore throat   Respiratory: negative for - cough, sputum  Cardiovascular: Reviewed in HPI  Gastrointestinal: negative for - abdominal pain, diarrhea, N/V  Hematology: negative for - bleeding, blood clots, bruising or jaundice  Genito-Urinary:  negative for - Dysuria or incontinence  Musculoskeletal: negative for - Joint swelling, muscle pain  Neurological: negative for - confusion, dizziness, headaches   Psychiatric: No anxiety, no depression. Dermatological: negative for - rash    Physical Examination:  There were no vitals filed for this visit. Wt Readings from Last 3 Encounters:   07/02/20 198 lb (89.8 kg)   05/21/20 204 lb 3.2 oz (92.6 kg)   05/13/20 202 lb 9.6 oz (91.9 kg)       · Constitutional: Oriented. No distress. · Head: Normocephalic and atraumatic. · Mouth/Throat: Oropharynx is clear and moist.   · Eyes: Conjunctivae normal. EOM are normal.   · Neck: Neck supple. No rigidity. No JVD present. · Cardiovascular: Normal rate, irregular rhythm, S1&S2. · Pulmonary/Chest: Bilateral respiratory sounds. No wheezes, No rhonchi. · Abdominal: Soft. Bowel sounds present. No distension, No tenderness. · Musculoskeletal: No tenderness. No edema    · Lymphadenopathy: Has no cervical adenopathy. · Neurological: Alert and oriented. Cranial nerve appears intact, No Gross deficit   · Skin: Skin is warm and dry. No rash noted. · Psychiatric: Has a normal behavior       Labs, diagnostic and imaging results reviewed. Reviewed.      ECG:    Echo: Conclusions      Summary   *Left ventricle - normal size, moderate concentric LVH, normal function with   EF of 91%   *Diastolic dysfunction with elevated filling pressures   *Mitral valve - annular calcification, calcified, mild regurgitation   *Aortic valve - TAVR valve well seated with mean gradient of 11mmHg   *Tricuspid valve - moderate regurgitation with PASP fo 65mmHg   *Pulmonic valve - mild regurgitation   *Left atrium - mildly dilated  Cath: Findings:          LM                   Normal  LAD                 Normal  CX                   Normal  RCA                 Normal  LVG                 NA  LVEDP            NA              Medication:  Current Outpatient Medications   Medication Sig Dispense Refill   

## 2020-07-28 NOTE — DISCHARGE SUMMARY
EP Discharge Summary  [address]    Patient :Denis Baeza  Room/Bed: 0DF-5395/8931-61  Medical Record: [id number]  YOB: 1935    Admit date: 7/28/2020  Discharge date: 07/28/20     Admitting Physician: Manuel Lopez MD   Discharge NP: Odalis Alexis CNP    Admission Diagnoses: Bradycardia, unspecified [R00.1]  Permanent atrial fibrillation [I48.21]  Bradycardia [R00.1]  Discharge Diagnoses: Symptomatic bradycardia    Discharged Condition: good    Hospital Course: The patient is a 81 y.o. male with a past medical history of LBBB, permanent atrial fibrillation, and aortic stenosis s/p TAVR (5/20). Noted to be bradycardic following the procedure with HR down to 30s. Interval HX: Patient presented for elective pacemaker placement. S/p Micra PPM placement yesterday with Dr. Meryle Deis. Uneventful post operative course. Bilateral groin sites soft, no hematoma/ooozing/swelling noted. Up in chair, ambulating in room without issue. Patient seen and examined. Notes, labs, and recent testing reviewed. Telemetry reviewed, pt in Vpaced    No new complaints today and no major events overnight. Denies having chest pain, palpitations, shortness of breath, edema or dizziness at the time of this visit. Consults: none    Objective:   BP (!) 157/63   Pulse 60   Temp 98.4 °F (36.9 °C) (Oral)   Resp 18   Ht 5' 7\" (1.702 m)   Wt 198 lb (89.8 kg)   SpO2 96%   BMI 31.01 kg/m²    No intake or output data in the 24 hours ending 07/28/20 1618    Physical Exam:  Telemetry: V-paced  General: Alert & Oriented x4 in no distress  Skin: Warm and dry, no cyanosis or bruising  Neck: JVD <8, no bruit  Respiratory: Respirations symmetric and unlabored. Lungs clear to auscultation bilaterally, no wheezing, rhonchi, or crackles  Cardiovascular: Regular rate and rhythm. S1/S2 present without murmurs, rubs, or gallops. Abdomen: Soft, nontender, +bowel sounds  Extremities: No edema.  Bilateral groin sites )  - Right and left groin sites stable, no hematoma/swelling/oozing  - Educated on post PPM discharge instructions/restrictions, pt VU    - I reviewed device interrogation today. Device functioning normally. ~ V-paced 97%  - Discussed with patient  - Follow up with device clinic as scheduled    2. Permanent Atrial Fibrillation  - Currently in Vpaced  - No rate control medications; will add BB as needed now that he is pacemaker protected    - [id number] score: 2 (Age x2) ; [id number] Vasc score and anticoagulation discussed. High risk for stroke and thromboembolism. Anticoagulation is recommended. Risk of bleeding was discussed.  ~ On Xarelto 20 mg QD; tolerating well (CrCl ~ 65 ml/min based on creatinine of 0    - Afib risk factors including age, HTN, obesity, inactivity and DAVE were discussed with patient. Risk factor modification recommended      - Treatment options including cardioversion, rate control strategy, antiarrhythmics, anticoagulation and possible ablation were discussed with patient. Risks, benefits and alternative of each treatment options were explained. All questions answered    3. Aortic Stenosis  - S/p TAVR (5/20)  - Stable  - Follow up with surveillance echo annually    4. LBBB   - Chronic, on old EKGs   - Asymptomatic    5. Obesity  - Body mass index is 31.01 kg/m². - Complicating assessment and treatment.  Placing patient at risk for multiple co-morbidities as well as early death and contributing to the patient's presentation  - Discussed weight loss and patient was encouraged to reduce calorie intake and increase exercise    Overall the patient is stable for discharge from a CV standpoint    Diet & Exercise:   The patient is counseled to follow a low salt diet to assure blood pressure remains controlled for cardiovascular risk factor modification   The patient is counseled to avoid excess caffeine, and energy drinks as this may exacerbated ectopy and arrhythmia   The patient is counseled to lose weight to control cardiovascular risk factors   Exercise program discussed: To improve overall cardiovascular health, the patient is instructed to increase cardiovascular related activities with a goal of 150 min/week of moderate level activity or 10,000 steps per day.  Encouraged to perform as much activity as tolerated    EF of 33%  ACEi for systolic HF: N/A  ASA and Statin for CAD: N/A  Anticoagulation for AF and heart failure: Yes (Xarelto)  Tobacco use was discussed with the patient and education provided: N/A  Documentation sent to PCP: Note sent to PCP office    Activity: See discharge instructions  Diet: cardiac diet  Wound Care: See discharge instructions    F/U:   1. Follow-up with EP in 3 months and device clinic as scheduled in one week  -Call [facility] at [phone] with any questions    Signed:  Rosendo Vines CNP  7/28/2020  4:18 PM    Total time spent on day of discharge including face-to-face visit, examination, documentation, counseling, preparation of discharge plans and follow-up, and discharge medicine reconciliation and prescriptions is >31 minutes

## 2020-07-28 NOTE — PROCEDURES
[facility]     Electrophysiology Procedure Note       Date of Procedure: 7/28/2020  Patient's Name: Anne Marie Zhuo  YOB: 1935   Medical Record Number: [id number]  Referring Physician: Lennox Lau MD  Procedure Performed by: Lennox Lau MD    Procedures performed:     · Insertion of MRI compatible right ventricular  Leadless pacemaker via Rt femoral vein(MICRA)  · Temporary transvenous pacemaker   · IV sedation. · Programming and analysis of the device     Mallampati3  ASA 3    Indication of the procedure: Anne Marie Zhou is a 81 y.o. male with symptomatic bradycardia          Details of procedure: The patient was brought to the electrophysiology laboratory in stable condition. The patient was in a fasting and non-sedated state. The risks, benefits and alternatives of the procedure were discussed with the patient   The risks including, but not limited to, the risks of vascular injury, bleeding, infection, device malfunction, lead dislodgement, radiation exposure, injury to cardiac and surrounding structures (including pneumothorax), stroke, myocardial infarction and death were discussed in detail. Patient opted to proceed with the device implantation. Written informed consent was signed and placed in the chart. Prophylactic antibiotic was given. An independent trained observer pushed medications at my direction. We monitored the patient's level of consciousness and vital signs/physiologic status throughout the procedure duration (see start and stop times below). Sedation:  3 mg Versed, 100 mcg Fentanyl  Sedation start: 1000  Sedation stop: 1040        The patient was prepped and draped in a sterile fashion. A timeout protocol was completed to identify the patient and the procedure being performed. Pre-sedation evaluation and airway assessment (Mallampatti classification, class llI) was completed. IV sedation was provided with IV Versed, Fentanyl.  Central venous access into the right femoral vein was obtained using the modified Seldinger technique. After central venous access was obtained, a sheath was placed in the  Vein and then after serial dilators the Micra delivery sheath was inserted into RA. 2000 units of heparin was given. The vein was pre-closed    During insertion of Micra, patient went into asystole. A 6 F sheath was inserted in LFV and a temporary pacemaker wire was advanced into RV. Then the Micra delivery catheter was advanced under fluoroscopy and the device was deployed in upper mid septum with good parameters. After repeat testing and satisfactory numbers, the tether was cut and removed and the sheath was removed and the venous access was closed with prior perclose devices and  figure of 8 suture. Temp pacer was removed. All sponge and needle counts were reported as correct at the end of the procedure. The patient tolerated the procedure well and there were no complications. Post-sedation evaluation was completed. Patient was transported to the holding area in stable condition. No complication  Blood loss 10 cc    Lead and device information:         Plan:     The patient will be admitted and have usual post-implant care, including chest x-ray, and interrogation of the device.

## 2020-07-28 NOTE — PROGRESS NOTES
Pt to floor from cathlab. Vss. Pt advised to lie flat until 1530. R and L groin sites CDI. Pedal pulses WNL. Pt states he is feeling nauseated. MD paged.

## 2020-07-29 ENCOUNTER — NURSE ONLY (OUTPATIENT)
Dept: CARDIOLOGY CLINIC | Age: 85
End: 2020-07-29

## 2020-07-29 VITALS
WEIGHT: 198 LBS | DIASTOLIC BLOOD PRESSURE: 71 MMHG | HEART RATE: 68 BPM | OXYGEN SATURATION: 96 % | BODY MASS INDEX: 31.08 KG/M2 | TEMPERATURE: 97.8 F | RESPIRATION RATE: 18 BRPM | HEIGHT: 67 IN | SYSTOLIC BLOOD PRESSURE: 156 MMHG

## 2020-07-29 PROCEDURE — 94760 N-INVAS EAR/PLS OXIMETRY 1: CPT

## 2020-07-29 PROCEDURE — 99217 PR OBSERVATION CARE DISCHARGE MANAGEMENT: CPT | Performed by: NURSE PRACTITIONER

## 2020-07-29 PROCEDURE — 93279 PRGRMG DEV EVAL PM/LDLS PM: CPT | Performed by: INTERNAL MEDICINE

## 2020-07-29 PROCEDURE — 6370000000 HC RX 637 (ALT 250 FOR IP): Performed by: INTERNAL MEDICINE

## 2020-07-29 RX ADMIN — ASPIRIN 81 MG: 81 TABLET, COATED ORAL at 09:10

## 2020-07-29 RX ADMIN — HYDROCHLOROTHIAZIDE 25 MG: 25 TABLET ORAL at 09:10

## 2020-07-29 NOTE — PROGRESS NOTES
PM assessment completed. Pt denying pain at this time. Pt has bilateral groin puncture sites for his pacemaker. Both sites are D&I, sites are soft with no hematoma. Pt's feet are warm, of natural color with + pedal pulses. Pt has been educated to notify RN if he has any bleeding, swelling or lose of sensation to his BLE. He is to report any chest pain. Pt states he understands. Call light within reach, will continue to monitor.

## 2020-07-29 NOTE — PROGRESS NOTES
Amelie  The CIED was interrogated and programmed and I supervised and reviewed all the data. All findings and changes are in device interrogation sheat and reflect my personal interpretation and changes and is scanned to [facility].

## 2020-07-29 NOTE — PROGRESS NOTES
Data- discharge order received, pt verbalized agreement to discharge, disposition to previous residence, no needs for HHC/DME. Action- discharge instructions prepared and given to patient and daughter, pt and daughter verbalized understanding. Medication information packet given r/t NEW and/or CHANGED prescriptions emphasizing name/purpose/side effects, pt verbalized understanding. Discharge instruction summary: Diet- general, Activity- independent, Primary Care Physician as follows: Gomez Lewis [phone] f/u advised, aware of device check scheduled for august 5. Inpatient surgical procedure precautions reviewed:  CHF Education reviewed. Response- Pt belongings gathered, IV removed. Disposition is home (no HHC/DME needs), transported with belongings, taken to lobby via w/c w/ PCA, no complications.

## 2020-08-05 ENCOUNTER — NURSE ONLY (OUTPATIENT)
Dept: CARDIOLOGY CLINIC | Age: 85
End: 2020-08-05
Payer: MEDICARE

## 2020-08-05 PROCEDURE — 93279 PRGRMG DEV EVAL PM/LDLS PM: CPT | Performed by: INTERNAL MEDICINE

## 2020-09-25 ENCOUNTER — TELEPHONE (OUTPATIENT)
Dept: CARDIOLOGY CLINIC | Age: 85
End: 2020-09-25

## 2020-10-06 NOTE — PROGRESS NOTES
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  AS - s/p TAVR. Doing well. No dizziness or syncope. No sob   cAFIB - s/p micra for bradycardia. No dizziness or syncope.  HTN - ambulatory checks in good range. No dizziness or headache   MED - Compliant with CV meds listed below without notable side effects. HISTORY/ALLERGIES/ROS     MedHx:  has a past medical history of Atrial fibrillation (HCC) and BPH (benign prostatic hyperplasia). SurgHx:  has a past surgical history that includes hernia repair (Left); Hip Arthroplasty (Left); shoulder surgery (Left); Aortic valve replacement (N/A, 5/12/2020); Aortic valve replacement (N/A, 5/12/2020); and pacemaker placement. SocHx:  reports that he has quit smoking. His smoking use included cigars. He has quit using smokeless tobacco.  His smokeless tobacco use included chew. He reports current alcohol use of about 7.0 standard drinks of alcohol per week. He reports that he does not use drugs. FamHx: family history is not on file. Allerg: Patient has no known allergies.    ROS: [x]Full ROS obtained and negative except as mentioned in HPI     MEDICATIONS      Current Outpatient Medications   Medication Sig Dispense Refill    hydroCHLOROthiazide (HYDRODIURIL) 25 MG tablet Take 1 tablet by mouth every morning 90 tablet 3    aspirin 81 MG EC tablet Take 1 tablet by mouth daily 30 tablet 3    XARELTO 20 MG TABS tablet TAKE ONE TABLET BY MOUTH DAILY WITH BREAKFAST 30 tablet 11    alfuzosin (UROXATRAL) 10 MG extended release tablet Take 10 mg by mouth daily      Misc Natural Products (OSTEO BI-FLEX ADV JOINT SHIELD PO) Take by mouth       No current facility-administered medications for this visit. Reviewed with patient and will remain unchanged except as mentioned in A/P  PHYSICAL EXAM     Vitals:    10/08/20 0904   BP: (!) 142/62   Pulse: 74   SpO2: 98%      Gen Alert, coop, no distress Heart  reg, 1/6   Head NC, AT, no abnorm Abd  Soft, NT, +BS, no mass, no OM   Eyes PER, conj/corn clear Ext  Ext nl, AT, no C/C/E   Nose Nares nl, no drain, NT Pulse 2+ and symmetric   Throat Lips, mucosa, tongue nl Skin Col/text/turg nl, no vis rash/les   Neck S/S, TM, NT, no bruit/JVD Psych Nl mood and affect   Lung CTA-B, unlabored, no DTP Lymph   No cervical or axillary LA   Ch wall NT, no deform Neuro  Nl gross M/S exam     ASSESSMENT AND PLAN      *AS   Date EF Detail   Sx   SOB   Hx 5/20  TAVR 26 mm Fallon S3    NYHA   II   MPI 7/17 NL Small inferior scar v diaphragm   TTE 2/20 5/20 6/20 65%  60%  55% Sev AS MG 40, mod TR  TAVR well seated MG 11, mod TR  TAVR MG 17, Mod TR   LHC 2/20  Normal Cors Adan Bong)   Plan   S/p TAVR and doing well  Echo yearly   *AFIB  Hx Persistent, CVS >1 , petra noted before and after surgery, OT 6/20-> Joshua Hodgkins, 7/20 MICRA implant  Status Irregular, on xarelto, pacing 92% of time  Plan Continue xarelto, interval interrogations of micra  *COMPLIANCE  Status Compliant  Plan Discussed importance of compliance with meds/diet/salt/exercise; avoid tob/alc/drugs; patient verbalized understanding  *FOLLOWUP  6 months      [address] Kellee Alvarenga, am scribing for and in the presence of Sendy Samson MD.   SignedKellee 10/06/20 2:36 PM   Provider Nithya Gann is working as a scribe for and in the presence of me (Sendy Samson MD). Working as a scribe, Kellee Miller may have prepopulated components of this note with my historical  intellectual property under my direct supervision. Any additions to this intellectual property were performed in my presence and at my direction.   Furthermore, the content and accuracy of this note have been reviewed by connie Wright MD).   10/8/2020 7:30 AM

## 2020-10-06 NOTE — PROGRESS NOTES
Natural Products (OSTEO BI-FLEX ADV JOINT SHIELD PO) Take by mouth Yes Historical Provider, MD      Past Medical History:  Past Medical History:   Diagnosis Date    Atrial fibrillation ([address]     BPH (benign prostatic hyperplasia)      Past Surgical History:    has a past surgical history that includes hernia repair (Left); Hip Arthroplasty (Left); shoulder surgery (Left); Aortic valve replacement (N/A, 5/12/2020); Aortic valve replacement (N/A, 5/12/2020); and pacemaker placement. Social History:  Reviewed. reports that he has quit smoking. His smoking use included cigars. He has quit using smokeless tobacco.  His smokeless tobacco use included chew. He reports current alcohol use of about 7.0 standard drinks of alcohol per week. He reports that he does not use drugs. Family History:  Reviewed. Denies family history of sudden cardiac death, arrhythmia, premature CAD    Review of System:  · Constitutional: Negative for fever, night sweats, chills, weight changes, or weakness  · Skin: Negative for rash, dry skin, pruritus, bruising, bleeding, blood clots, or changes in skin pigment  · HEENT: Negative for vision changes, ringing in the ears, sore throat, dysphagia, or swollen lymph nodes  · Respiratory: Reviewed in HPI  · Cardiovascular: Reviewed in HPI  · Gastrointestinal: Negative for abdominal pain, N/V/D, constipation, or black/tarry stools  · Genito-Urinary: Negative for dysuria, incontinence, urgency, or hematuria  · Musculoskeletal: Negative for joint swelling, muscle pain, or injuries  · Neurological/Psych: Negative for confusion, seizures, dizziness, headaches, balance issues or TIA-like symptoms.  No anxiety, depression, or insomnia    Physical Examination:  Vitals:    10/28/20 1048   BP: 138/70   Pulse: 68   SpO2: 96%      Wt Readings from Last 3 Encounters:   10/28/20 197 lb 1.6 oz (89.4 kg)   10/08/20 196 lb (88.9 kg)   07/28/20 198 lb (89.8 kg)     Constitutional: Cooperative and in no apparent exercise    Plan:  1. No changes  2. Call office if any issues  3. Exercise and work as tolerated    F/U: Follow-up with Dr. Tani Reynoso in 6 months and EP in 9 months  -Follow up with device clinic as scheduled  -Call [facility] at [phone] with any questions    Diet & Exercise:   The patient is counseled to follow a low salt diet to assure blood pressure remains controlled for cardiovascular risk factor modification   The patient is counseled to avoid excess caffeine, and energy drinks as this may exacerbated ectopy and arrhythmia   The patient is counseled to lose weight to control cardiovascular risk factors   Exercise program discussed: To improve overall cardiovascular health, the patient is instructed to increase cardiovascular related activities with a goal of 150 min/week of moderate level activity or 10,000 steps per day. Encouraged to perform as much activity as tolerated    Quality Metrics  1. Tobacco Cessation Counseling: N/A  2. Retake of BP if >140/90: N/A   3. Documentation to PCP: Note sent to PCP office visit  4. CAD patient on anti-platelet: N/A   5.   CAD patient on STATIN therapy: N/A   6. Patient with history of CHF and atrial fibrillation on anticoagulation: Yes      I have addressed the patient's cardiac risk factors and adjusted pharmacologic treatment as needed. In addition, I have reinforced the need for patient directed risk factor modification. I independently reviewed the device check interrogation and ECG    All questions and concerns were addressed with the patient. Alternatives to treatment were discussed. Thank you for allowing to us to participate in the care of Krysta Keating.     Bruna Adler, APRN-CNP  [facility]   Office: [phone]

## 2020-10-08 ENCOUNTER — OFFICE VISIT (OUTPATIENT)
Dept: CARDIOLOGY CLINIC | Age: 85
End: 2020-10-08

## 2020-10-08 VITALS
WEIGHT: 196 LBS | DIASTOLIC BLOOD PRESSURE: 62 MMHG | SYSTOLIC BLOOD PRESSURE: 142 MMHG | HEIGHT: 68 IN | HEART RATE: 74 BPM | OXYGEN SATURATION: 98 % | BODY MASS INDEX: 29.7 KG/M2

## 2020-10-08 PROCEDURE — 99024 POSTOP FOLLOW-UP VISIT: CPT | Performed by: INTERNAL MEDICINE

## 2020-10-08 NOTE — LETTER
 hydroCHLOROthiazide (HYDRODIURIL) 25 MG tablet Take 1 tablet by mouth every morning 90 tablet 3    aspirin 81 MG EC tablet Take 1 tablet by mouth daily 30 tablet 3    XARELTO 20 MG TABS tablet TAKE ONE TABLET BY MOUTH DAILY WITH BREAKFAST 30 tablet 11    alfuzosin (UROXATRAL) 10 MG extended release tablet Take 10 mg by mouth daily      Misc Natural Products (OSTEO BI-FLEX ADV JOINT SHIELD PO) Take by mouth       No current facility-administered medications for this visit.       Reviewed with patient and will remain unchanged except as mentioned in A/P  PHYSICAL EXAM     Vitals:    10/08/20 0904   BP: (!) 142/62   Pulse: 74   SpO2: 98%      Gen Alert, coop, no distress Heart  reg, 1/6   Head NC, AT, no abnorm Abd  Soft, NT, +BS, no mass, no OM   Eyes PER, conj/corn clear Ext  Ext nl, AT, no C/C/E   Nose Nares nl, no drain, NT Pulse 2+ and symmetric   Throat Lips, mucosa, tongue nl Skin Col/text/turg nl, no vis rash/les   Neck S/S, TM, NT, no bruit/JVD Psych Nl mood and affect   Lung CTA-B, unlabored, no DTP Lymph   No cervical or axillary LA   Ch wall NT, no deform Neuro  Nl gross M/S exam     ASSESSMENT AND PLAN      *AS   Date EF Detail   Sx   SOB   Hx 5/20  TAVR 26 mm Fallon S3    NYHA   II   MPI 7/17 NL Small inferior scar v diaphragm   TTE 2/20 5/20 6/20 65%  60%  55% Sev AS MG 40, mod TR  TAVR well seated MG 11, mod TR  TAVR MG 17, Mod TR   LHC 2/20  Normal Cors Branda Gwyn)   Plan   S/p TAVR and doing well  Echo yearly   *AFIB  Hx Persistent, CVS >1 , petra noted before and after surgery, MCOT 6/20-> Carmel Fend, 7/20 MICRA implant  Status Irregular, on xarelto, pacing 92% of time  Plan Continue xarelto, interval interrogations of micra  *COMPLIANCE  Status Compliant  Plan Discussed importance of compliance with meds/diet/salt/exercise; avoid tob/alc/drugs; patient verbalized understanding  *FOLLOWUP  6 months      [address] EMERY, Tami Razo, am scribing for and in the presence of Joleen Shanks MD. Signed, Kuldip Baptiste 10/06/20 2:36 PM   Provider Rodriguez Bahena is working as a scribe for and in the presence of me Adeline Perkins MD). Working as a scribe, Kuldip Baptiste may have prepopulated components of this note with my historical  intellectual property under my direct supervision. Any additions to this intellectual property were performed in my presence and at my direction. Furthermore, the content and accuracy of this note have been reviewed by me Adeline Perkins MD). 10/8/2020 7:30 AM      If you have questions, please do not hesitate to call me. I look forward to following Jerome Crabtree along with you.     Sincerely,        Joe Collins MD

## 2020-10-28 ENCOUNTER — OFFICE VISIT (OUTPATIENT)
Dept: CARDIOLOGY CLINIC | Age: 85
End: 2020-10-28
Payer: MEDICARE

## 2020-10-28 ENCOUNTER — NURSE ONLY (OUTPATIENT)
Dept: CARDIOLOGY CLINIC | Age: 85
End: 2020-10-28
Payer: MEDICARE

## 2020-10-28 VITALS
HEART RATE: 68 BPM | HEIGHT: 67 IN | WEIGHT: 197.1 LBS | SYSTOLIC BLOOD PRESSURE: 138 MMHG | BODY MASS INDEX: 30.93 KG/M2 | OXYGEN SATURATION: 96 % | DIASTOLIC BLOOD PRESSURE: 70 MMHG

## 2020-10-28 PROCEDURE — G8484 FLU IMMUNIZE NO ADMIN: HCPCS | Performed by: NURSE PRACTITIONER

## 2020-10-28 PROCEDURE — 93279 PRGRMG DEV EVAL PM/LDLS PM: CPT | Performed by: INTERNAL MEDICINE

## 2020-10-28 PROCEDURE — 93000 ELECTROCARDIOGRAM COMPLETE: CPT | Performed by: NURSE PRACTITIONER

## 2020-10-28 PROCEDURE — 99214 OFFICE O/P EST MOD 30 MIN: CPT | Performed by: NURSE PRACTITIONER

## 2020-10-28 PROCEDURE — G8417 CALC BMI ABV UP PARAM F/U: HCPCS | Performed by: NURSE PRACTITIONER

## 2020-10-28 PROCEDURE — 1123F ACP DISCUSS/DSCN MKR DOCD: CPT | Performed by: NURSE PRACTITIONER

## 2020-10-28 PROCEDURE — 4040F PNEUMOC VAC/ADMIN/RCVD: CPT | Performed by: NURSE PRACTITIONER

## 2020-10-28 PROCEDURE — 1036F TOBACCO NON-USER: CPT | Performed by: NURSE PRACTITIONER

## 2020-10-28 PROCEDURE — G8427 DOCREV CUR MEDS BY ELIG CLIN: HCPCS | Performed by: NURSE PRACTITIONER

## 2020-10-28 NOTE — PROGRESS NOTES
Patient comes in for programming evaluation for his pacemaker. All sensing and pacing parameters are within normal range. Battery life > 8 years.  87.3%. No changes need to be made at this time. Please see interrogation for more detail. Patient will see NPSR today and follow up in 3 months in office or remotely.

## 2021-01-18 ENCOUNTER — TELEPHONE (OUTPATIENT)
Dept: CARDIOLOGY CLINIC | Age: 86
End: 2021-01-18

## 2021-01-18 NOTE — TELEPHONE ENCOUNTER
Pt wife/and daughter Sybil Singleton calling stating DCE tried to call pt yesterday they would like to know what it was about? There was no message. Pt was in the ER twice over the weekend.  Pls call to advise Thank you

## 2021-02-01 ENCOUNTER — NURSE ONLY (OUTPATIENT)
Dept: CARDIOLOGY CLINIC | Age: 86
End: 2021-02-01
Payer: MEDICARE

## 2021-02-01 DIAGNOSIS — I48.20 CHRONIC ATRIAL FIBRILLATION (HCC): ICD-10-CM

## 2021-02-01 DIAGNOSIS — R00.1 SYMPTOMATIC BRADYCARDIA: ICD-10-CM

## 2021-02-01 DIAGNOSIS — Z95.0 PACEMAKER: ICD-10-CM

## 2021-02-01 DIAGNOSIS — R00.1 BRADYCARDIA: ICD-10-CM

## 2021-02-01 PROCEDURE — 93296 REM INTERROG EVL PM/IDS: CPT | Performed by: INTERNAL MEDICINE

## 2021-02-01 PROCEDURE — 93294 REM INTERROG EVL PM/LDLS PM: CPT | Performed by: INTERNAL MEDICINE

## 2021-02-01 NOTE — PROGRESS NOTES
We received remote transmission from patient's monitor at home. Transmission shows normal sensing and pacing function. EP physician will review. See interrogation under cardiology tab in the [address] [facility] [address] field for more details.

## 2021-02-01 NOTE — LETTER
[address] [facility] [phone]  [address] [facility] [phone]    Pacemaker/Defibrillator Clinic          02/01/21        [address] [facility] [address]        Dear Uche Guzm[place]land    This letter is to inform you that we received the transmission from your monitor at home that checks your implanted heart device. The next date you should transmit will be 5-3-21. If your report requires attention, we will notify you. Please be aware that the remote device transmission sites are periodically monitored only during regular business hours during which simultaneous in-office device clinics are being run. If your transmission requires attention, we will contact you as soon as possible. Thank you.             [facility]

## 2021-02-04 RX ORDER — RIVAROXABAN 20 MG/1
TABLET, FILM COATED ORAL
Qty: 30 TABLET | Refills: 10 | Status: SHIPPED | OUTPATIENT
Start: 2021-02-04 | End: 2022-01-07

## 2021-04-12 ENCOUNTER — TELEPHONE (OUTPATIENT)
Dept: CARDIOLOGY CLINIC | Age: 86
End: 2021-04-12

## 2021-04-12 NOTE — TELEPHONE ENCOUNTER
Per DCE, patient does not need antibiotics prior to this dental procedure since patient is currently taking ampicillin. I left a message with patient's wife relaying this.

## 2021-04-12 NOTE — TELEPHONE ENCOUNTER
Wife states pt is having a dental procedure and asking if he will need an antibiotic. Wife states pt is already taking an antibiotic ampicillin  for stones in bladder. Please call to advise.

## 2021-04-13 NOTE — PROGRESS NOTES
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 Recent urinary infection, on abx.  AS - s/p TAVR. Doing well. No dizziness or syncope. No sob. Recent echo. No f/c/s    cAFIB - s/p micra for bradycardia. No dizziness or syncope.  HTN - ambulatory checks in good range. No dizziness or headache    MED - Compliant with CV meds listed below without notable side effects. HISTORY/ALLERGIES/ROS     MedHx:  has a past medical history of Atrial fibrillation (HCC) and BPH (benign prostatic hyperplasia). SurgHx:  has a past surgical history that includes hernia repair (Left); Hip Arthroplasty (Left); shoulder surgery (Left); Aortic valve replacement (N/A, 5/12/2020); Aortic valve replacement (N/A, 5/12/2020); and pacemaker placement. SocHx:  reports that he has quit smoking. His smoking use included cigars. He has quit using smokeless tobacco.  His smokeless tobacco use included chew. He reports current alcohol use of about 7.0 standard drinks of alcohol per week. He reports that he does not use drugs. FamHx: family history is not on file. Allerg: Patient has no known allergies.    ROS: [x]Full ROS obtained and negative except as mentioned in HPI     MEDICATIONS      Current Outpatient Medications   Medication Sig Dispense Refill    XARELTO 20 MG TABS tablet TAKE ONE TABLET BY MOUTH DAILY WITH BREAKFAST 30 tablet 10    hydroCHLOROthiazide (HYDRODIURIL) 25 MG tablet Take 1 tablet by mouth every morning 90 tablet 3    aspirin 81 MG EC tablet Take 1 tablet by mouth daily 30 tablet 3    alfuzosin (UROXATRAL) 10 MG extended release tablet Take 10 mg by mouth daily      Misc Natural Products (OSTEO BI-FLEX ADV JOINT SHIELD PO) Take by mouth       No current facility-administered medications for this visit. Reviewed with patient and will remain unchanged except as mentioned in A/P  PHYSICAL EXAM     Vitals:    04/15/21 1000   BP: (!) 158/82   Pulse: 68   SpO2: 99%      Gen Alert, coop, no distress Heart  Rrr, no mrg   Head NC, AT, no abnorm Abd  Soft, NT, +BS, no mass, no OM   Eyes PER, conj/corn clear Ext  Ext nl, AT, no C/C/E   Nose Nares nl, no drain, NT Pulse 2+ and symmetric   Throat Lips, mucosa, tongue nl Skin Col/text/turg nl, no vis rash/les   Neck S/S, TM, NT, no bruit/JVD Psych Nl mood and affect   Lung CTA-B, unlabored, no DTP Lymph   No cervical or axillary LA   Ch wall NT, no deform Neuro  Nl gross M/S exam     ASSESSMENT AND PLAN      *AS   Date EF Detail   Sx   SOB   Hx 5/20  TAVR 26 mm Fallon S3    NYHA   II   MPI 7/17 NL Small inferior scar v diaphragm   TTE 2/20  5/20  6/20  3/21 65%  60%  55%  60% Sev AS MG 40, mod TR  TAVR well seated MG 11, mod TR  TAVR MG 17, Mod TR  TAVR MG 16, mod pulm htn, R atrium severely dilated   LHC 2/20  Normal Cors Tiffanie Pretzel)   Plan   S/p TAVR and doing well  Echo after abx course complete  Watch for f/c/s, follows with ID   *AFIB  Hx Persistent, CVS >1 , petra noted before and after surgery, MCOT 6/20-> Rosalind Card, 7/20 MICRA implant  Status Irregular, on xarelto, pacing 92% of time  Plan Continue xarelto, interval interrogations of micra  *COMPLIANCE  Status Compliant  Plan Discussed importance of compliance with meds/diet/salt/exercise; avoid tob/alc/drugs; patient verbalized understanding  *FOLLOWUP  3 months with echo    [address] Teo Alvarenga, am scribing for and in the presence of Diane Min MD.   Signed, Teo Vines 04/13/21 12:51 PM   Provider Cullen Baum is working as a scribe for and in the presence of me (Diane Min MD).   Working as a scribe, Teo Vines may have prepopulated components of this note with my historical intellectual property under my direct supervision. Any additions to this intellectual property were performed in my presence and at my direction. Furthermore, the content and accuracy of this note have been reviewed by me Polly Stapleton MD).  4/15/2021 8:41 AM  CODING     Category Diagnosis   Stable chronic illness  (98672/87216 - 2 or more) AS, AFIB   Chronic illness with: Exac, progr or SA of Tx  (62378/74885 - 1 or more)    Undiagnosed new problem with: uncertain prognosis  (93112/44894 - 1 or more)    Acute illness with systemic Sx  (48923/27959 - 1 or more)    Acute, complicated injury  (38794/09385 - 1 or more)    70748 1 or more chronic illness with exacerbation, progression or SA of treatment    Time  30-39 minutes spent preparing to see patient including reviewing patient history/prior tests/prior consults, performing a medical exam, counseling and educating patient/family/caregiver, ordering medications/tests/procedures, referring and communicating with PCPs and other pertinent consultants, documenting information in the EMR, independently interpreting results and communicating to family and coordination of patient care.

## 2021-04-14 ENCOUNTER — TELEPHONE (OUTPATIENT)
Dept: CARDIOLOGY CLINIC | Age: 86
End: 2021-04-14

## 2021-04-14 ENCOUNTER — TELEPHONE (OUTPATIENT)
Dept: CARDIOLOGY | Age: 86
End: 2021-04-14

## 2021-04-14 NOTE — TELEPHONE ENCOUNTER
Pt has an appt on 4/15, but daughter wanted dce to be aware that pt recently had septic and was treated at [facility] asking if dce could review that info. Pt is also going to be having a laser procedure in the near future bladder stones and will need cardiac clearance.

## 2021-04-15 ENCOUNTER — OFFICE VISIT (OUTPATIENT)
Dept: CARDIOLOGY CLINIC | Age: 86
End: 2021-04-15
Payer: MEDICARE

## 2021-04-15 VITALS
DIASTOLIC BLOOD PRESSURE: 82 MMHG | HEART RATE: 68 BPM | BODY MASS INDEX: 31.17 KG/M2 | SYSTOLIC BLOOD PRESSURE: 158 MMHG | OXYGEN SATURATION: 99 % | WEIGHT: 199 LBS

## 2021-04-15 DIAGNOSIS — Z95.2 S/P TAVR (TRANSCATHETER AORTIC VALVE REPLACEMENT): ICD-10-CM

## 2021-04-15 DIAGNOSIS — Z95.0 CARDIAC PACEMAKER IN SITU: ICD-10-CM

## 2021-04-15 DIAGNOSIS — I48.19 PERSISTENT ATRIAL FIBRILLATION (HCC): ICD-10-CM

## 2021-04-15 DIAGNOSIS — R00.1 BRADYCARDIA: ICD-10-CM

## 2021-04-15 DIAGNOSIS — I35.0 NONRHEUMATIC AORTIC VALVE STENOSIS: Primary | ICD-10-CM

## 2021-04-15 PROCEDURE — 99214 OFFICE O/P EST MOD 30 MIN: CPT | Performed by: INTERNAL MEDICINE

## 2021-04-15 PROCEDURE — 1036F TOBACCO NON-USER: CPT | Performed by: INTERNAL MEDICINE

## 2021-04-15 PROCEDURE — G8427 DOCREV CUR MEDS BY ELIG CLIN: HCPCS | Performed by: INTERNAL MEDICINE

## 2021-04-15 PROCEDURE — 1123F ACP DISCUSS/DSCN MKR DOCD: CPT | Performed by: INTERNAL MEDICINE

## 2021-04-15 PROCEDURE — G8417 CALC BMI ABV UP PARAM F/U: HCPCS | Performed by: INTERNAL MEDICINE

## 2021-04-15 PROCEDURE — 4040F PNEUMOC VAC/ADMIN/RCVD: CPT | Performed by: INTERNAL MEDICINE

## 2021-04-15 NOTE — LETTER
[address]  [address] [address]  Phone: [phone]  Fax: [phone]    Su Venegas MD        April 16, 2021     Christine De Luna, [address] [place] [address]    Patient: Fauzia Roth  MR Number: [id number]  YOB: 1935  Date of Visit: 4/15/2021    Dear Dr. 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  AS - s/p TAVR. Doing well. No dizziness or syncope. No sob. Recent echo. No f/c/s    cAFIB - s/p micra for bradycardia. No dizziness or syncope.  HTN - ambulatory checks in good range. No dizziness or headache    MED - Compliant with CV meds listed below without notable side effects. HISTORY/ALLERGIES/ROS     MedHx:  has a past medical history of Atrial fibrillation (HCC) and BPH (benign prostatic hyperplasia). SurgHx:  has a past surgical history that includes hernia repair (Left); Hip Arthroplasty (Left); shoulder surgery (Left); Aortic valve replacement (N/A, 5/12/2020); Aortic valve replacement (N/A, 5/12/2020); and pacemaker placement. SocHx:  reports that he has quit smoking. His smoking use included cigars. He has quit using smokeless tobacco.  His smokeless tobacco use included chew. He reports current alcohol use of about 7.0 standard drinks of alcohol per week. He reports that he does not use drugs. FamHx: family history is not on file. Allerg: Patient has no known allergies.    ROS: [x]Full ROS obtained and negative except as mentioned in HPI     MEDICATIONS      Current Outpatient Medications   Medication Sig Dispense Refill    XARELTO 20 MG TABS tablet TAKE ONE TABLET BY MOUTH DAILY WITH BREAKFAST 30 tablet 10    hydroCHLOROthiazide (HYDRODIURIL) 25 MG tablet Take 1 tablet by mouth every morning 90 tablet 3    aspirin 81 MG EC tablet Take 1 tablet by mouth daily 30 tablet 3    alfuzosin (UROXATRAL) 10 MG extended release tablet Take 10 mg by mouth daily      Misc Natural Products (OSTEO BI-FLEX ADV JOINT SHIELD PO) Take by mouth       No current facility-administered medications for this visit.       Reviewed with patient and will remain unchanged except as mentioned in A/P  PHYSICAL EXAM     Vitals:    04/15/21 1000   BP: (!) 158/82   Pulse: 68   SpO2: 99%      Gen Alert, coop, no distress Heart  Rrr, no mrg   Head NC, AT, no abnorm Abd  Soft, NT, +BS, no mass, no OM   Eyes PER, conj/corn clear Ext  Ext nl, AT, no C/C/E   Nose Nares nl, no drain, NT Pulse 2+ and symmetric   Throat Lips, mucosa, tongue nl Skin Col/text/turg nl, no vis rash/les   Neck S/S, TM, NT, no bruit/JVD Psych Nl mood and affect   Lung CTA-B, unlabored, no DTP Lymph   No cervical or axillary LA   Ch wall NT, no deform Neuro  Nl gross M/S exam     ASSESSMENT AND PLAN      *AS   Date EF Detail   Sx   SOB   Hx 5/20  TAVR 26 mm Fallon S3    NYHA   II   MPI 7/17 NL Small inferior scar v diaphragm   TTE 2/20  5/20  6/20  3/21 65%  60%  55%  60% Sev AS MG 40, mod TR  TAVR well seated MG 11, mod TR  TAVR MG 17, Mod TR  TAVR MG 16, mod pulm htn, R atrium severely dilated   LHC 2/20  Normal Cors Nataliia Nichole)   Plan   S/p TAVR and doing well  Echo after abx course complete  Watch for f/c/s, follows with ID   *AFIB  Hx Persistent, CVS >1 , petra noted before and after surgery, MCOT 6/20-> Fabio Neelam, 7/20 MICRA implant  Status Irregular, on xarelto, pacing 92% of time  Plan Continue xarelto, interval interrogations of micra  *COMPLIANCE  Status Compliant  Plan Discussed importance of compliance with meds/diet/salt/exercise; avoid tob/alc/drugs; patient verbalized understanding *FOLLOWUP  3 months with [address] Shonda Alvarenga, am scribing for and in the presence of Magda Lehman MD.   SignedShonda 04/13/21 12:51 PM   Provider Gianna Navarro is working as a scribe for and in the presence of me Magda Lehman MD). Working as a scribe, Shonda Cadena may have prepopulated components of this note with my historical  intellectual property under my direct supervision. Any additions to this intellectual property were performed in my presence and at my direction. Furthermore, the content and accuracy of this note have been reviewed by connie Lehman MD).  4/15/2021 8:41 AM  CODING     Category Diagnosis   Stable chronic illness  (09188/08640 - 2 or more) AS, AFIB   Chronic illness with: Exac, progr or SA of Tx  (17144/19848 - 1 or more)    Undiagnosed new problem with: uncertain prognosis  (49271/04335 - 1 or more)    Acute illness with systemic Sx  (22681/21681 - 1 or more)    Acute, complicated injury  (39901/38880 - 1 or more)    92083 1 or more chronic illness with exacerbation, progression or SA of treatment    Time  30-39 minutes spent preparing to see patient including reviewing patient history/prior tests/prior consults, performing a medical exam, counseling and educating patient/family/caregiver, ordering medications/tests/procedures, referring and communicating with PCPs and other pertinent consultants, documenting information in the EMR, independently interpreting results and communicating to family and coordination of patient care. If you have questions, please do not hesitate to call me. I look forward to following Cosmo Flor along with you.     Sincerely,        Kim Castelan MD

## 2021-05-03 RX ORDER — HYDROCHLOROTHIAZIDE 25 MG/1
TABLET ORAL
Qty: 90 TABLET | Refills: 2 | Status: SHIPPED | OUTPATIENT
Start: 2021-05-03 | End: 2022-02-11

## 2021-05-03 NOTE — TELEPHONE ENCOUNTER
Received refill request for hctz from [facility] pharmacy.     Last ov:4/15/2021 DCE    Last labs:bmp 7/28/2020    Last Refill: 5/21/2020 #90 with 3 refills    Next appointment:7/15/2021 DCE

## 2021-05-10 ENCOUNTER — NURSE ONLY (OUTPATIENT)
Dept: CARDIOLOGY CLINIC | Age: 86
End: 2021-05-10
Payer: MEDICARE

## 2021-05-10 DIAGNOSIS — I48.20 CHRONIC ATRIAL FIBRILLATION (HCC): ICD-10-CM

## 2021-05-10 DIAGNOSIS — R00.1 BRADYCARDIA: ICD-10-CM

## 2021-05-10 DIAGNOSIS — R00.1 SYMPTOMATIC BRADYCARDIA: ICD-10-CM

## 2021-05-10 DIAGNOSIS — Z95.0 PACEMAKER: ICD-10-CM

## 2021-05-10 PROCEDURE — 93296 REM INTERROG EVL PM/IDS: CPT | Performed by: INTERNAL MEDICINE

## 2021-05-10 PROCEDURE — 93294 REM INTERROG EVL PM/LDLS PM: CPT | Performed by: INTERNAL MEDICINE

## 2021-05-10 NOTE — LETTER
[address] [facility] [phone]  [address] [facility],[address] [phone]    Pacemaker/Defibrillator Clinic        05/10/21        [address] [facility] [address]      Dear Michael Collins    This letter is to inform you that we received the transmission from your monitor at home that checks your implanted heart device. The next date you should transmit will be 8-10-21. If your report requires attention, we will notify you. Please be aware that the remote device transmission sites are periodically monitored only during regular business hours during which simultaneous in-office device clinics are being run. If your transmission requires attention, we will contact you as soon as possible. **PLEASE NOTE** that our [facility] policy and processes are changing to ensure a more seamless approach for all parties involved, allowing more time for our nurses to address patient issues and concerns. We will no longer be sending letters for NORMAL remote transmissions. You will be contacted by phone if your transmission requires attention (as previously done), and letters will only be sent regarding monitor disconnections or missed transmissions if you are unable to be reached by phone. Please do not be alarmed by this new process, as we will continue to contact you when you are due for your transmission AND/OR if your transmission report requires attention. This will be your final NORMAL remote received letter. From this point forward, the [facility] will be utilizing the no news is good news approach. As always, please feel free to contact your nurse with any questions or concerns. Thank you.       Kristan [address]

## 2021-07-08 NOTE — PROGRESS NOTES
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  AS - s/p TAVR. Doing well. No dizziness or syncope. No sob. No f/c/s    cAFIB - s/p micra for bradycardia. No dizziness or syncope.  HTN - ambulatory checks in good range. No dizziness or headache    MED - Compliant with CV meds listed below without notable side effects. HISTORY/ALLERGIES/ROS     MedHx:  has a past medical history of Atrial fibrillation (HCC) and BPH (benign prostatic hyperplasia). SurgHx:  has a past surgical history that includes hernia repair (Left); Hip Arthroplasty (Left); shoulder surgery (Left); Aortic valve replacement (N/A, 5/12/2020); Aortic valve replacement (N/A, 5/12/2020); and pacemaker placement. SocHx:  reports that he has quit smoking. His smoking use included cigars. He has quit using smokeless tobacco.  His smokeless tobacco use included chew. He reports current alcohol use of about 7.0 standard drinks of alcohol per week. He reports that he does not use drugs. FamHx: family history is not on file. Allerg: Patient has no known allergies.    ROS: [x]Full ROS obtained and negative except as mentioned in HPI     MEDICATIONS      Current Outpatient Medications   Medication Sig Dispense Refill    hydroCHLOROthiazide (HYDRODIURIL) 25 MG tablet TAKE ONE TABLET BY MOUTH EVERY MORNING 90 tablet 2    Psyllium (METAMUCIL FIBER PO) Take by mouth      XARELTO 20 MG TABS tablet TAKE ONE TABLET BY MOUTH DAILY WITH BREAKFAST 30 tablet 10    aspirin 81 MG EC tablet Take 1 tablet by mouth daily 30 tablet 3    alfuzosin (UROXATRAL) 10 MG extended release tablet Take 10 mg by mouth daily      Misc Natural Products (OSTEO BI-FLEX ADV JOINT SHIELD PO) Take by mouth       No current facility-administered medications for this visit. Reviewed with patient and will remain unchanged except as mentioned in A/P  PHYSICAL EXAM     Vitals:    07/15/21 0933   BP: 130/72   Pulse: 62   SpO2: 98%      Gen Alert, coop, no distress Heart  Rrr, no mrg   Head NC, AT, no abnorm Abd  Soft, NT, +BS, no mass, no OM   Eyes PER, conj/corn clear Ext  Ext nl, AT, no C/C, tr edema   Nose Nares nl, no drain, NT Pulse 2+ and symmetric   Throat Lips, mucosa, tongue nl Skin Col/text/turg nl, no vis rash/les   Neck S/S, TM, NT, no bruit/JVD Psych Nl mood and affect   Lung CTA-B, unlabored, no DTP Lymph   No cervical or axillary LA   Ch wall NT, no deform Neuro  Nl gross M/S exam     ASSESSMENT AND PLAN      *AS   Date EF Detail   Sx   SOB   Hx 5/20  TAVR 26 mm Fallon S3    NYHA   II   MPI 7/17 NL Small inferior scar v diaphragm   TTE 2/20  5/20  6/20  3/21  7/21 65%  60%  55%  60%  60% Sev AS MG 40, mod TR  TAVR well seated MG 11, mod TR  TAVR MG 17, Mod TR  TAVR MG 16, mod pulm htn, R atrium severely dilated  TAVR MG 12, mod TR   LHC 2/20  Normal Cors [place])   Plan   S/p TAVR and doing well  Echo after abx course complete  Watch for f/c/s, follows with ID   *AFIB  Hx Persistent, CVS >1 , petra noted before and after surgery, MCOT 6/20-> Hiro Watts, 7/20 MICRA implant  Status Irregular, on xarelto, pacing 92% of time  Plan Continue xarelto, interval interrogations of micra  *COMPLIANCE  Status Compliant  Plan Discussed importance of compliance with meds/diet/salt/exercise; avoid tob/alc/drugs; patient verbalized understanding  *FOLLOWUP  6 months    [address] EMERY, Kuldip Baptiste, am scribing for and in the presence of Maria Eugenia Loredo MD.   Signed, Kuldip Baptiste 07/08/21 10:48 AM   Provider Rodriguez Bahena is working as a scribe for and in the presence of me (Maria Eugenia Loredo MD).   Working as a scribe, Kuldip Wilsonalejandra may have prepopulated components of this note with my historical  intellectual property under my direct supervision. Any additions to this intellectual property were performed in my presence and at my direction. Furthermore, the content and accuracy of this note have been reviewed by me Alen You MD).  7/15/2021 10:03 AM  CODING     Category Diagnosis   Stable chronic illness  (43354/31384 - 2 or more) AS, AFIB   Chronic illness with: Exac, progr or SA of Tx  (37831/07774 - 1 or more)    Undiagnosed new problem with: uncertain prognosis  (29849/66169 - 1 or more)    Acute illness with systemic Sx  (36367/23746 - 1 or more)    Acute, complicated injury  (23125/11275 - 1 or more)    04780 1 or more chronic illness with exacerbation, progression or SA of treatment    Time  30-39 minutes spent preparing to see patient including reviewing patient history/prior tests/prior consults, performing a medical exam, counseling and educating patient/family/caregiver, ordering medications/tests/procedures, referring and communicating with PCPs and other pertinent consultants, documenting information in the EMR, independently interpreting results and communicating to family and coordination of patient care.

## 2021-07-15 ENCOUNTER — OFFICE VISIT (OUTPATIENT)
Dept: CARDIOLOGY CLINIC | Age: 86
End: 2021-07-15
Payer: MEDICARE

## 2021-07-15 ENCOUNTER — HOSPITAL ENCOUNTER (OUTPATIENT)
Dept: NON INVASIVE DIAGNOSTICS | Age: 86
Discharge: HOME | End: 2021-07-15
Payer: MEDICARE

## 2021-07-15 VITALS
OXYGEN SATURATION: 98 % | SYSTOLIC BLOOD PRESSURE: 130 MMHG | HEIGHT: 67 IN | WEIGHT: 204 LBS | HEART RATE: 62 BPM | BODY MASS INDEX: 32.02 KG/M2 | DIASTOLIC BLOOD PRESSURE: 72 MMHG

## 2021-07-15 DIAGNOSIS — Z95.2 S/P TAVR (TRANSCATHETER AORTIC VALVE REPLACEMENT): ICD-10-CM

## 2021-07-15 DIAGNOSIS — Z95.0 PACEMAKER: ICD-10-CM

## 2021-07-15 DIAGNOSIS — I48.19 PERSISTENT ATRIAL FIBRILLATION (HCC): ICD-10-CM

## 2021-07-15 DIAGNOSIS — I35.0 NONRHEUMATIC AORTIC VALVE STENOSIS: Primary | ICD-10-CM

## 2021-07-15 DIAGNOSIS — I35.0 NONRHEUMATIC AORTIC VALVE STENOSIS: ICD-10-CM

## 2021-07-15 DIAGNOSIS — R00.1 SYMPTOMATIC BRADYCARDIA: ICD-10-CM

## 2021-07-15 LAB
LV EF: 60 %
LVEF MODALITY: NORMAL

## 2021-07-15 PROCEDURE — 99214 OFFICE O/P EST MOD 30 MIN: CPT | Performed by: INTERNAL MEDICINE

## 2021-07-15 PROCEDURE — G8417 CALC BMI ABV UP PARAM F/U: HCPCS | Performed by: INTERNAL MEDICINE

## 2021-07-15 PROCEDURE — G8427 DOCREV CUR MEDS BY ELIG CLIN: HCPCS | Performed by: INTERNAL MEDICINE

## 2021-07-15 PROCEDURE — 93306 TTE W/DOPPLER COMPLETE: CPT

## 2021-07-15 PROCEDURE — 1123F ACP DISCUSS/DSCN MKR DOCD: CPT | Performed by: INTERNAL MEDICINE

## 2021-07-15 PROCEDURE — 4040F PNEUMOC VAC/ADMIN/RCVD: CPT | Performed by: INTERNAL MEDICINE

## 2021-07-15 PROCEDURE — 1036F TOBACCO NON-USER: CPT | Performed by: INTERNAL MEDICINE

## 2021-07-15 RX ORDER — FINASTERIDE 5 MG/1
5 TABLET, FILM COATED ORAL DAILY
COMMUNITY

## 2021-08-18 ENCOUNTER — NURSE ONLY (OUTPATIENT)
Dept: CARDIOLOGY CLINIC | Age: 86
End: 2021-08-18
Payer: MEDICARE

## 2021-08-18 DIAGNOSIS — Z95.0 PACEMAKER: Primary | ICD-10-CM

## 2021-08-18 DIAGNOSIS — R00.1 BRADYCARDIA: ICD-10-CM

## 2021-08-18 PROCEDURE — 93296 REM INTERROG EVL PM/IDS: CPT | Performed by: INTERNAL MEDICINE

## 2021-08-18 PROCEDURE — 93294 REM INTERROG EVL PM/LDLS PM: CPT | Performed by: INTERNAL MEDICINE

## 2021-08-18 NOTE — PROGRESS NOTES
We received remote transmission from patient's monitor at home. Transmission shows normal sensing and pacing function. EP physician will review. See interrogation under cardiology tab in the [address]\"" [address] field for more details.

## 2021-11-29 ENCOUNTER — NURSE ONLY (OUTPATIENT)
Dept: CARDIOLOGY CLINIC | Age: 86
End: 2021-11-29
Payer: MEDICARE

## 2021-11-29 DIAGNOSIS — Z95.0 PACEMAKER: ICD-10-CM

## 2021-11-29 DIAGNOSIS — R00.1 BRADYCARDIA: ICD-10-CM

## 2021-11-30 PROCEDURE — 93294 REM INTERROG EVL PM/LDLS PM: CPT | Performed by: INTERNAL MEDICINE

## 2021-11-30 PROCEDURE — 93296 REM INTERROG EVL PM/IDS: CPT | Performed by: INTERNAL MEDICINE

## 2021-12-10 NOTE — PROGRESS NOTES
Name_______________________________________Printed:____________________  Date and time of surgery_12/14/2021____1530__________________Arrival Time:___1400  main_____________   1. The instructions given regarding when and if a patient needs to stop oral intake prior to surgery varies. Follow the specific instructions you were given                  __x_Nothing to eat or to drink after Midnight the night before.                   ____Carbo loading or ERAS instructions will be given to select patients-if you have been given those instructions -please do the following                           The evening before your surgery after dinner before midnight drink 40 ounces of gatorade. If you are diabetic use sugar free. The morning of surgery drink 40 ounces of water. This needs to be finished 3 hours prior to your surgery start time. 2. Take the following pills with a small sip of water on the morning of surgery_none__________________________________________________                  Do not take blood pressure medications ending in pril or sartan the logan prior to surgery or the morning of surgery_   3. Aspirin, Ibuprofen, Advil, Naproxen, Vitamin E and other Anti-inflammatory products and supplements should be stopped for 5 -7days before surgery or as directed by your physician. 4. Check with your Doctor regarding stopping Plavix, Coumadin,Eliquis, Lovenox,Effient,Pradaxa,Xarelto, Fragmin or other blood thinners and follow their instructions. 5. Do not smoke, and do not drink any alcoholic beverages 24 hours prior to surgery. This includes NA Beer. Refrain from the usage of any recreational drugs. 6. You may brush your teeth and gargle the morning of surgery. DO NOT SWALLOW WATER   7. You MUST make arrangements for a responsible adult to stay on site while you are here and take you home after your surgery. You will not be allowed to leave alone or drive yourself home.   It is strongly suggested someone stay with you the first 24 hrs. Your surgery will be cancelled if you do not have a ride home. 8. A parent/legal guardian must accompany a child scheduled for surgery and plan to stay at the hospital until the child is discharged. Please do not bring other children with you. 9. Please wear simple, loose fitting clothing to the hospital.  Paulinoisteen Cava not bring valuables (money, credit cards, checkbooks, etc.) Do not wear any makeup (including no eye makeup) or nail polish on your fingers or toes. 10. DO NOT wear any jewelry or piercings on day of surgery. All body piercing jewelry must be removed. 11. If you have ___dentures, they will be removed before going to the OR; we will provide you a container. If you wear ___contact lenses or ___glasses, they will be removed; please bring a case for them. 12. Please see your family doctor/pediatrician for a history & physical and/or concerning medications. Bring any test results/reports from your physician's office. PCP__________________Phone___________H&P Appt. Date________             13 If you  have a Living Will and Durable Power of  for Healthcare, please bring in a copy. 15. Notify your Surgeon if you develop any illness between now and surgery  time, cough, cold, fever, sore throat, nausea, vomiting, etc.  Please notify your surgeon if you experience dizziness, shortness of breath or blurred vision between now & the time of your surgery             15. DO NOT shave your operative site 96 hours prior to surgery. For face & neck surgery, men may use an electric razor 48 hours prior to surgery. 16. Shower the night before or morning of surgery using an antibacterial soap or as you have been instructed. 17. To provide excellent care visitors will be limited to one in the room at any given time. 18.  Please bring picture ID and insurance card.              19.  Visit our web site for additional information:  [email or web address]              20.During flu season no children under the age of 15 are permitted in the hospital for the safety of all patients. 21. If you take a long acting insulin in the evening only  take half of your usual  dose the night  before your procedure              22. If you use a c-pap please bring DOS if staying overnight,             23.For your convenience [facility] has a pharmacy on site to fill your prescriptions. 24. If you use oxygen and have a portable tank please bring it  with you the DOS             25. Bring a complete list of all your medications with name and dose include any supplements. 26. Other__________________________________________   *Please call pre admission testing if you any further questions   [address] [place]  [phone]   [facility]    ___ Covid test to be done 3-5 days prior to scheduled surgery -patient aware they are REQUIRED to bring a copy of the negative result DOS-if they receive a positive result to notify their surgeon         If known - indicate where patient is getting covid test done ___________________________________________________________    _x__ Rapid - DOS    ___ Other___________________________________      Karole Pun POLICY(subject to change)    There is a one visitor policy at [address] [place] [address] for all surgeries and endoscopies. Whether the visitor can stay or will be asked to wait in the car will depend on the current policy and if social distancing can be maintained. The policy is subject to change at any time. Please make sure the visitor has a cell phone that is on,charged and able to accept calls, as this may be the way that the staff communicates with them. Pain management is NO VISITOR policyThe patients ride is expected to remain in the car with a cell phone for communication. If the ride is leaving the hospital grounds please make sure they are back in time for pickup. Have the patient inform the staff on arrival what their rides plans are while the patient is in the facility. At the MAIN there is one visitor allowed. Please note that the visitor policy is subject to change. All above information reviewed with patient in person or by phone. Patient verbalizes understanding. All questions and concerns addressed.                                                                                                  Patient/Rep__wife, Thao__________________                                                                                                                                    PRE OP INSTRUCTIONS

## 2021-12-14 ENCOUNTER — ANESTHESIA EVENT (OUTPATIENT)
Dept: OPERATING ROOM | Age: 86
End: 2021-12-14
Payer: MEDICARE

## 2021-12-14 ENCOUNTER — HOSPITAL ENCOUNTER (OUTPATIENT)
Age: 86
Setting detail: OUTPATIENT SURGERY
Discharge: HOME | End: 2021-12-14
Attending: UROLOGY | Admitting: UROLOGY
Payer: MEDICARE

## 2021-12-14 ENCOUNTER — ANESTHESIA (OUTPATIENT)
Dept: OPERATING ROOM | Age: 86
End: 2021-12-14
Payer: MEDICARE

## 2021-12-14 VITALS
OXYGEN SATURATION: 95 % | SYSTOLIC BLOOD PRESSURE: 157 MMHG | HEIGHT: 67 IN | WEIGHT: 199.9 LBS | TEMPERATURE: 97.4 F | DIASTOLIC BLOOD PRESSURE: 63 MMHG | BODY MASS INDEX: 31.37 KG/M2 | RESPIRATION RATE: 16 BRPM | HEART RATE: 60 BPM

## 2021-12-14 VITALS
OXYGEN SATURATION: 98 % | DIASTOLIC BLOOD PRESSURE: 78 MMHG | SYSTOLIC BLOOD PRESSURE: 132 MMHG | RESPIRATION RATE: 13 BRPM

## 2021-12-14 DIAGNOSIS — N40.1 BPH WITH OBSTRUCTION/LOWER URINARY TRACT SYMPTOMS: Primary | Chronic | ICD-10-CM

## 2021-12-14 DIAGNOSIS — N13.8 BPH WITH OBSTRUCTION/LOWER URINARY TRACT SYMPTOMS: Primary | Chronic | ICD-10-CM

## 2021-12-14 LAB
ANION GAP SERPL CALCULATED.3IONS-SCNC: 11 MMOL/L (ref 3–16)
BUN BLDV-MCNC: 17 MG/DL (ref 7–20)
CALCIUM SERPL-MCNC: 9 MG/DL (ref 8.3–10.6)
CHLORIDE BLD-SCNC: 100 MMOL/L (ref 99–110)
CO2: 27 MMOL/L (ref 21–32)
CREAT SERPL-MCNC: 0.7 MG/DL (ref 0.8–1.3)
GFR AFRICAN AMERICAN: >60
GFR NON-AFRICAN AMERICAN: >60
GLUCOSE BLD-MCNC: 95 MG/DL (ref 70–99)
HCT VFR BLD CALC: 38.5 % (ref 40.5–52.5)
HEMOGLOBIN: 12.7 G/DL (ref 13.5–17.5)
MCH RBC QN AUTO: 30.2 PG (ref 26–34)
MCHC RBC AUTO-ENTMCNC: 33 G/DL (ref 31–36)
MCV RBC AUTO: 91.5 FL (ref 80–100)
PDW BLD-RTO: 15.3 % (ref 12.4–15.4)
PLATELET # BLD: 212 K/UL (ref 135–450)
PMV BLD AUTO: 7.2 FL (ref 5–10.5)
POTASSIUM SERPL-SCNC: 4.1 MMOL/L (ref 3.5–5.1)
RBC # BLD: 4.21 M/UL (ref 4.2–5.9)
SARS-COV-2, NAAT: NOT DETECTED
SODIUM BLD-SCNC: 138 MMOL/L (ref 136–145)
WBC # BLD: 5 K/UL (ref 4–11)

## 2021-12-14 PROCEDURE — 7100000011 HC PHASE II RECOVERY - ADDTL 15 MIN: Performed by: UROLOGY

## 2021-12-14 PROCEDURE — 6360000002 HC RX W HCPCS: Performed by: UROLOGY

## 2021-12-14 PROCEDURE — 2500000003 HC RX 250 WO HCPCS: Performed by: NURSE ANESTHETIST, CERTIFIED REGISTERED

## 2021-12-14 PROCEDURE — 7100000000 HC PACU RECOVERY - FIRST 15 MIN: Performed by: UROLOGY

## 2021-12-14 PROCEDURE — 2580000003 HC RX 258: Performed by: NURSE ANESTHETIST, CERTIFIED REGISTERED

## 2021-12-14 PROCEDURE — 80048 BASIC METABOLIC PNL TOTAL CA: CPT

## 2021-12-14 PROCEDURE — 3700000001 HC ADD 15 MINUTES (ANESTHESIA): Performed by: UROLOGY

## 2021-12-14 PROCEDURE — 36415 COLL VENOUS BLD VENIPUNCTURE: CPT

## 2021-12-14 PROCEDURE — 2709999900 HC NON-CHARGEABLE SUPPLY: Performed by: UROLOGY

## 2021-12-14 PROCEDURE — 85027 COMPLETE CBC AUTOMATED: CPT

## 2021-12-14 PROCEDURE — 3700000000 HC ANESTHESIA ATTENDED CARE: Performed by: UROLOGY

## 2021-12-14 PROCEDURE — 87635 SARS-COV-2 COVID-19 AMP PRB: CPT

## 2021-12-14 PROCEDURE — 6370000000 HC RX 637 (ALT 250 FOR IP): Performed by: UROLOGY

## 2021-12-14 PROCEDURE — 3600000004 HC SURGERY LEVEL 4 BASE: Performed by: UROLOGY

## 2021-12-14 PROCEDURE — 2720000010 HC SURG SUPPLY STERILE: Performed by: UROLOGY

## 2021-12-14 PROCEDURE — 7100000001 HC PACU RECOVERY - ADDTL 15 MIN: Performed by: UROLOGY

## 2021-12-14 PROCEDURE — 2580000003 HC RX 258: Performed by: ANESTHESIOLOGY

## 2021-12-14 PROCEDURE — 3600000014 HC SURGERY LEVEL 4 ADDTL 15MIN: Performed by: UROLOGY

## 2021-12-14 PROCEDURE — 7100000010 HC PHASE II RECOVERY - FIRST 15 MIN: Performed by: UROLOGY

## 2021-12-14 PROCEDURE — 6360000002 HC RX W HCPCS: Performed by: NURSE ANESTHETIST, CERTIFIED REGISTERED

## 2021-12-14 PROCEDURE — 2580000003 HC RX 258: Performed by: UROLOGY

## 2021-12-14 RX ORDER — HYDROCODONE BITARTRATE AND ACETAMINOPHEN 5; 325 MG/1; MG/1
1 TABLET ORAL EVERY 6 HOURS PRN
Qty: 12 TABLET | Refills: 0 | Status: SHIPPED | OUTPATIENT
Start: 2021-12-14 | End: 2021-12-17

## 2021-12-14 RX ORDER — ATROPA BELLADONNA AND OPIUM 16.2; 6 MG/1; MG/1
SUPPOSITORY RECTAL
Status: COMPLETED | OUTPATIENT
Start: 2021-12-14 | End: 2021-12-14

## 2021-12-14 RX ORDER — LIDOCAINE HYDROCHLORIDE 20 MG/ML
INJECTION, SOLUTION EPIDURAL; INFILTRATION; INTRACAUDAL; PERINEURAL PRN
Status: DISCONTINUED | OUTPATIENT
Start: 2021-12-14 | End: 2021-12-14 | Stop reason: SDUPTHER

## 2021-12-14 RX ORDER — SODIUM CHLORIDE, SODIUM LACTATE, POTASSIUM CHLORIDE, CALCIUM CHLORIDE 600; 310; 30; 20 MG/100ML; MG/100ML; MG/100ML; MG/100ML
INJECTION, SOLUTION INTRAVENOUS CONTINUOUS PRN
Status: DISCONTINUED | OUTPATIENT
Start: 2021-12-14 | End: 2021-12-14 | Stop reason: SDUPTHER

## 2021-12-14 RX ORDER — HYDROMORPHONE HCL 110MG/55ML
0.25 PATIENT CONTROLLED ANALGESIA SYRINGE INTRAVENOUS EVERY 5 MIN PRN
Status: DISCONTINUED | OUTPATIENT
Start: 2021-12-14 | End: 2021-12-14 | Stop reason: HOSPADM

## 2021-12-14 RX ORDER — LIDOCAINE HYDROCHLORIDE 10 MG/ML
1 INJECTION, SOLUTION EPIDURAL; INFILTRATION; INTRACAUDAL; PERINEURAL
Status: DISCONTINUED | OUTPATIENT
Start: 2021-12-14 | End: 2021-12-14 | Stop reason: HOSPADM

## 2021-12-14 RX ORDER — LIDOCAINE HYDROCHLORIDE 10 MG/ML
0.5 INJECTION, SOLUTION EPIDURAL; INFILTRATION; INTRACAUDAL; PERINEURAL ONCE
Status: DISCONTINUED | OUTPATIENT
Start: 2021-12-14 | End: 2021-12-14 | Stop reason: HOSPADM

## 2021-12-14 RX ORDER — SULFAMETHOXAZOLE AND TRIMETHOPRIM 800; 160 MG/1; MG/1
1 TABLET ORAL 2 TIMES DAILY
Qty: 10 TABLET | Refills: 0 | Status: SHIPPED | OUTPATIENT
Start: 2021-12-14 | End: 2021-12-19

## 2021-12-14 RX ORDER — SODIUM CHLORIDE, SODIUM LACTATE, POTASSIUM CHLORIDE, CALCIUM CHLORIDE 600; 310; 30; 20 MG/100ML; MG/100ML; MG/100ML; MG/100ML
INJECTION, SOLUTION INTRAVENOUS CONTINUOUS
Status: DISCONTINUED | OUTPATIENT
Start: 2021-12-14 | End: 2021-12-14 | Stop reason: HOSPADM

## 2021-12-14 RX ORDER — SUCCINYLCHOLINE CHLORIDE 20 MG/ML
INJECTION INTRAMUSCULAR; INTRAVENOUS PRN
Status: DISCONTINUED | OUTPATIENT
Start: 2021-12-14 | End: 2021-12-14 | Stop reason: SDUPTHER

## 2021-12-14 RX ORDER — MAGNESIUM HYDROXIDE 1200 MG/15ML
LIQUID ORAL CONTINUOUS PRN
Status: COMPLETED | OUTPATIENT
Start: 2021-12-14 | End: 2021-12-14

## 2021-12-14 RX ORDER — SODIUM CHLORIDE 0.9 % (FLUSH) 0.9 %
10 SYRINGE (ML) INJECTION EVERY 12 HOURS SCHEDULED
Status: DISCONTINUED | OUTPATIENT
Start: 2021-12-14 | End: 2021-12-14 | Stop reason: HOSPADM

## 2021-12-14 RX ORDER — ONDANSETRON 2 MG/ML
INJECTION INTRAMUSCULAR; INTRAVENOUS PRN
Status: DISCONTINUED | OUTPATIENT
Start: 2021-12-14 | End: 2021-12-14 | Stop reason: SDUPTHER

## 2021-12-14 RX ORDER — SODIUM CHLORIDE 9 MG/ML
25 INJECTION, SOLUTION INTRAVENOUS PRN
Status: DISCONTINUED | OUTPATIENT
Start: 2021-12-14 | End: 2021-12-14 | Stop reason: HOSPADM

## 2021-12-14 RX ORDER — HYDROCODONE BITARTRATE AND ACETAMINOPHEN 5; 325 MG/1; MG/1
1 TABLET ORAL
Status: DISCONTINUED | OUTPATIENT
Start: 2021-12-14 | End: 2021-12-14 | Stop reason: HOSPADM

## 2021-12-14 RX ORDER — MAGNESIUM HYDROXIDE 1200 MG/15ML
LIQUID ORAL
Status: COMPLETED | OUTPATIENT
Start: 2021-12-14 | End: 2021-12-14

## 2021-12-14 RX ORDER — ONDANSETRON 2 MG/ML
4 INJECTION INTRAMUSCULAR; INTRAVENOUS
Status: DISCONTINUED | OUTPATIENT
Start: 2021-12-14 | End: 2021-12-14 | Stop reason: HOSPADM

## 2021-12-14 RX ORDER — CIPROFLOXACIN 2 MG/ML
400 INJECTION, SOLUTION INTRAVENOUS
Status: COMPLETED | OUTPATIENT
Start: 2021-12-14 | End: 2021-12-14

## 2021-12-14 RX ORDER — AMOXICILLIN 250 MG
1 CAPSULE ORAL 2 TIMES DAILY
Qty: 50 TABLET | Refills: 0 | Status: SHIPPED | OUTPATIENT
Start: 2021-12-14 | End: 2022-07-28 | Stop reason: ALTCHOICE

## 2021-12-14 RX ORDER — PROPOFOL 10 MG/ML
INJECTION, EMULSION INTRAVENOUS PRN
Status: DISCONTINUED | OUTPATIENT
Start: 2021-12-14 | End: 2021-12-14 | Stop reason: SDUPTHER

## 2021-12-14 RX ORDER — SODIUM CHLORIDE 0.9 % (FLUSH) 0.9 %
10 SYRINGE (ML) INJECTION PRN
Status: DISCONTINUED | OUTPATIENT
Start: 2021-12-14 | End: 2021-12-14 | Stop reason: HOSPADM

## 2021-12-14 RX ORDER — PROCHLORPERAZINE EDISYLATE 5 MG/ML
5 INJECTION INTRAMUSCULAR; INTRAVENOUS
Status: DISCONTINUED | OUTPATIENT
Start: 2021-12-14 | End: 2021-12-14 | Stop reason: HOSPADM

## 2021-12-14 RX ORDER — HYDRALAZINE HYDROCHLORIDE 20 MG/ML
5 INJECTION INTRAMUSCULAR; INTRAVENOUS EVERY 10 MIN PRN
Status: DISCONTINUED | OUTPATIENT
Start: 2021-12-14 | End: 2021-12-14 | Stop reason: HOSPADM

## 2021-12-14 RX ORDER — LABETALOL HYDROCHLORIDE 5 MG/ML
5 INJECTION, SOLUTION INTRAVENOUS EVERY 10 MIN PRN
Status: DISCONTINUED | OUTPATIENT
Start: 2021-12-14 | End: 2021-12-14 | Stop reason: HOSPADM

## 2021-12-14 RX ORDER — FENTANYL CITRATE 50 UG/ML
INJECTION, SOLUTION INTRAMUSCULAR; INTRAVENOUS PRN
Status: DISCONTINUED | OUTPATIENT
Start: 2021-12-14 | End: 2021-12-14 | Stop reason: SDUPTHER

## 2021-12-14 RX ORDER — DEXAMETHASONE SODIUM PHOSPHATE 4 MG/ML
INJECTION, SOLUTION INTRA-ARTICULAR; INTRALESIONAL; INTRAMUSCULAR; INTRAVENOUS; SOFT TISSUE PRN
Status: DISCONTINUED | OUTPATIENT
Start: 2021-12-14 | End: 2021-12-14 | Stop reason: SDUPTHER

## 2021-12-14 RX ORDER — FENTANYL CITRATE 50 UG/ML
25 INJECTION, SOLUTION INTRAMUSCULAR; INTRAVENOUS EVERY 5 MIN PRN
Status: DISCONTINUED | OUTPATIENT
Start: 2021-12-14 | End: 2021-12-14 | Stop reason: HOSPADM

## 2021-12-14 RX ORDER — ROCURONIUM BROMIDE 10 MG/ML
INJECTION, SOLUTION INTRAVENOUS PRN
Status: DISCONTINUED | OUTPATIENT
Start: 2021-12-14 | End: 2021-12-14 | Stop reason: SDUPTHER

## 2021-12-14 RX ADMIN — FENTANYL CITRATE 50 MCG: 50 INJECTION, SOLUTION INTRAMUSCULAR; INTRAVENOUS at 16:12

## 2021-12-14 RX ADMIN — DEXAMETHASONE SODIUM PHOSPHATE 4 MG: 4 INJECTION, SOLUTION INTRAMUSCULAR; INTRAVENOUS at 16:25

## 2021-12-14 RX ADMIN — LIDOCAINE HYDROCHLORIDE 100 MG: 20 INJECTION, SOLUTION EPIDURAL; INFILTRATION; INTRACAUDAL; PERINEURAL at 16:16

## 2021-12-14 RX ADMIN — FENTANYL CITRATE 50 MCG: 50 INJECTION, SOLUTION INTRAMUSCULAR; INTRAVENOUS at 16:34

## 2021-12-14 RX ADMIN — SUCCINYLCHOLINE CHLORIDE 100 MG: 20 INJECTION, SOLUTION INTRAMUSCULAR; INTRAVENOUS at 16:17

## 2021-12-14 RX ADMIN — SUGAMMADEX 200 MG: 100 INJECTION, SOLUTION INTRAVENOUS at 17:22

## 2021-12-14 RX ADMIN — ONDANSETRON 4 MG: 2 INJECTION INTRAMUSCULAR; INTRAVENOUS at 16:25

## 2021-12-14 RX ADMIN — CIPROFLOXACIN 400 MG: 2 INJECTION, SOLUTION INTRAVENOUS at 16:04

## 2021-12-14 RX ADMIN — SODIUM CHLORIDE, POTASSIUM CHLORIDE, SODIUM LACTATE AND CALCIUM CHLORIDE: 600; 310; 30; 20 INJECTION, SOLUTION INTRAVENOUS at 15:33

## 2021-12-14 RX ADMIN — SODIUM CHLORIDE 1000 ML: 9 INJECTION, SOLUTION INTRAVENOUS at 18:10

## 2021-12-14 RX ADMIN — ROCURONIUM BROMIDE 30 MG: 10 INJECTION, SOLUTION INTRAVENOUS at 16:57

## 2021-12-14 RX ADMIN — ROCURONIUM BROMIDE 20 MG: 10 INJECTION, SOLUTION INTRAVENOUS at 16:36

## 2021-12-14 RX ADMIN — PROPOFOL 120 MG: 10 INJECTION, EMULSION INTRAVENOUS at 16:17

## 2021-12-14 ASSESSMENT — PULMONARY FUNCTION TESTS
PIF_VALUE: 24
PIF_VALUE: 25
PIF_VALUE: 15
PIF_VALUE: 24
PIF_VALUE: 24
PIF_VALUE: 4
PIF_VALUE: 22
PIF_VALUE: 24
PIF_VALUE: 23
PIF_VALUE: 24
PIF_VALUE: 18
PIF_VALUE: 2
PIF_VALUE: 24
PIF_VALUE: 25
PIF_VALUE: 28
PIF_VALUE: 24
PIF_VALUE: 2
PIF_VALUE: 24
PIF_VALUE: 4
PIF_VALUE: 24
PIF_VALUE: 24
PIF_VALUE: 2
PIF_VALUE: 24
PIF_VALUE: 3
PIF_VALUE: 24
PIF_VALUE: 20
PIF_VALUE: 24
PIF_VALUE: 22
PIF_VALUE: 25
PIF_VALUE: 18
PIF_VALUE: 25
PIF_VALUE: 24
PIF_VALUE: 20
PIF_VALUE: 16
PIF_VALUE: 24
PIF_VALUE: 23
PIF_VALUE: 25
PIF_VALUE: 24
PIF_VALUE: 23
PIF_VALUE: 25
PIF_VALUE: 17
PIF_VALUE: 24
PIF_VALUE: 23
PIF_VALUE: 17
PIF_VALUE: 24
PIF_VALUE: 23
PIF_VALUE: 25
PIF_VALUE: 24
PIF_VALUE: 19
PIF_VALUE: 25
PIF_VALUE: 2
PIF_VALUE: 2
PIF_VALUE: 25
PIF_VALUE: 25
PIF_VALUE: 24
PIF_VALUE: 1
PIF_VALUE: 0
PIF_VALUE: 25
PIF_VALUE: 2
PIF_VALUE: 25
PIF_VALUE: 15
PIF_VALUE: 24
PIF_VALUE: 2
PIF_VALUE: 24
PIF_VALUE: 23
PIF_VALUE: 25
PIF_VALUE: 25
PIF_VALUE: 2
PIF_VALUE: 22
PIF_VALUE: 18
PIF_VALUE: 24
PIF_VALUE: 25

## 2021-12-14 ASSESSMENT — ENCOUNTER SYMPTOMS: SHORTNESS OF BREATH: 0

## 2021-12-14 ASSESSMENT — PAIN - FUNCTIONAL ASSESSMENT: PAIN_FUNCTIONAL_ASSESSMENT: 0-10

## 2021-12-14 ASSESSMENT — PAIN SCALES - GENERAL
PAINLEVEL_OUTOF10: 0

## 2021-12-14 NOTE — PROGRESS NOTES
Teaching / education initiated regarding perioperative experience, expectations, and pain management during stay. Patient verbalized understanding.     Pt has a pacemaker

## 2021-12-14 NOTE — OP NOTE
Urology Operative Report  [facility]    Provider: Elías Perez MD MD Patient ID:  Admission Date: 2021 Name: Wyatt Lord Date: 2021  MRN: [id number]   Patient Location: OR/NONE : 1935  Attending: Elías Perez MD Date of Service: 2021  PCP: Rohan Clmeens MD     Date of Operation: 2021    Preoperative Diagnosis: BPH w obstruction    Postoperative Diagnosis: same    Procedure:    1. Rigid cystoscopy  2. Greenlight photovaporization of the prostate    Surgeon:   Elías Perez MD, MD    Anesthesia: General endotracheal anesthesia    Indications: Shavon Sethi is a 81 y.o. male who presents for the above named surgery. Informed consent was obtained and the risks, benefits, and details of the procedure were explained to the patient who elected to proceed. Details of Procedure: The patient was brought to the operating room and placed in the supine position on the operating room table. SCDs were placed on the lower extremities. Following induction of anesthesia the patient was positioned in a lithotomy position, all pressure points were padded, and the genitals were prepped and draped in the usual sterile fashion. A routine timeout was performed, confirming the patient, procedure, site, risk of fire, patient allergies and confirming that preoperative antibiotics had been administered prior to beginning. A cystoscope was used to perform an evaluation of the anatomy the prostate was enlarged with Tri-lobar hypertrophy. The ureteral orifices were visualized and were away from the expected area of resection. The cystoscope was removed, and the resectoscope was placed. A greenlight fiber was used to create a channel at the 6 o'clock location until a trough was created to the veru montanum. The lateral lobes were then taken down systematically until the prostatic capsule was identified in several locations. Hemostasis was ensured.  The ureteral orifices were visualized again and were free from injury. The prostatic fossa was nicely opened up. The bladder was left full and the scope as removed. With some abdominal pressure there was a good urinary stream. A Ramos catheter was placed into the bladder for continuous drainage of the bladder. The urine color was light pink. At the end of the procedure all counts were correct. The patient tolerated the procedure well and was transported to the PACU in stable condition. Findings: successful creation of an open prostatic fossa. No evidence of ureteral orifice injury. Very nice posterior plane which tracked nicely along the adenoma. B&O placed per the rectum. 188k Joules used. Estimated Blood Loss: minimal                  Drains: 20 3 way Fr Ramos catheter          Specimens: none    Complications: none apparent           Disposition:  PACU - hemodynamically stable.             Chelsi Lee MD, MD  12/14/2021

## 2021-12-14 NOTE — ANESTHESIA PRE PROCEDURE
Department of Anesthesiology  Preprocedure Note       Name:  Dennis Perez   Age:  81 y.o.  :  1935                                          MRN:  [id number]         Date:  2021      Surgeon: Sharmila Wong):  Stacy Black MD    Procedure: Procedure(s):  CYSTOSCOPY WITH GREENLIGHT LASER PROCEDURE  PROSTATE    Medications prior to admission:   Prior to Admission medications    Medication Sig Start Date End Date Taking?  Authorizing Provider   finasteride (PROSCAR) 5 MG tablet Take 5 mg by mouth daily   Yes Historical Provider, MD   Probiotic Product (PROBIOTIC DAILY PO) Take by mouth   Yes Historical Provider, MD   hydroCHLOROthiazide (HYDRODIURIL) 25 MG tablet TAKE ONE TABLET BY MOUTH EVERY MORNING 5/3/21  Yes Kaye Barnes MD   Psyllium (METAMUCIL FIBER PO) Take by mouth   Yes Historical Provider, MD   XARELTO 20 MG TABS tablet TAKE ONE TABLET BY MOUTH DAILY WITH BREAKFAST 21  Yes Kaye Barnes MD   aspirin 81 MG EC tablet Take 1 tablet by mouth daily 20  Yes Kaye Barnes MD   alfuzosin (UROXATRAL) 10 MG extended release tablet Take 10 mg by mouth daily   Yes Historical Provider, MD   Misc Natural Products (OSTEO BI-FLEX ADV JOINT SHIELD PO) Take by mouth   Yes Historical Provider, MD   ampicillin (PRINCIPEN) 250 MG capsule Take 500 mg by mouth daily     Historical Provider, MD       Current medications:    Current Facility-Administered Medications   Medication Dose Route Frequency Provider Last Rate Last Admin    lactated ringers infusion   IntraVENous Continuous Stacy Black MD        lidocaine PF 1 % injection 0.5 mL  0.5 mL IntraDERmal Once Stacy Black MD        ciprofloxacin (CIPRO) IVPB 400 mg  400 mg IntraVENous On Call to [facility] [address], MD           Allergies:  No Known Allergies    Problem List:    Patient Active Problem List   Diagnosis Code    Nonrheumatic aortic valve stenosis I35.0    Persistent atrial fibrillation ([facility] [address] I48.19    Chronic atrial fibrillation I48.20    S/P TAVR (transcatheter aortic valve replacement) Z95.2    Symptomatic bradycardia R00.1    Pacemaker Z95.0    Bradycardia R00.1    LBBB (left bundle branch block) I44.7       Past Medical History:        Diagnosis Date    Arthritis     Atrial fibrillation (HCC)     BPH (benign prostatic hyperplasia)     PONV (postoperative nausea and vomiting)        Past Surgical History:        Procedure Laterality Date    AORTIC VALVE REPLACEMENT N/A 5/12/2020    TRANSCATHETER AORTIC VALVE REPLACEMENT FEMORAL APPROACH performed by Reyes Olson MD at [address] [facility] N/A 5/12/2020    TRANSCATHETER AORTIC VALVE REPLACEMENT FEMORAL APPROACH performed by Amber Brenner MD at [address] Left     HIP ARTHROPLASTY Left     JOINT REPLACEMENT Left     THR    PACEMAKER PLACEMENT  2020    SHOULDER SURGERY Left        Social History:    Social History     Tobacco Use    Smoking status: Former Smoker     Types: Cigars    Smokeless tobacco: Former User     Types: Chew    Tobacco comment: few cigars when was young. never habit   Substance Use Topics    Alcohol use: Yes     Alcohol/week: 7.0 standard drinks     Types: 7 Cans of beer per week     Comment: per week                                Counseling given: Not Answered  Comment: few cigars when was young. never habit      Vital Signs (Current):   Vitals:    12/10/21 1256   Weight: 195 lb (88.5 kg)   Height: 5' 7\" (1.702 m)                                              BP Readings from Last 3 Encounters:   07/15/21 130/72   04/15/21 (!) 158/82   10/28/20 138/70       NPO Status:                                                                                 BMI:   Wt Readings from Last 3 Encounters:   12/10/21 195 lb (88.5 kg)   07/15/21 204 lb (92.5 kg)   04/15/21 199 lb (90.3 kg)     Body mass index is 30.54 kg/m².     CBC:   Lab Results   Component Value Date    WBC 3.5 07/28/2020    RBC 4.25 07/28/2020    HGB 13.2 07/28/2020    HCT 39.7 07/28/2020    MCV 93.4 07/28/2020    RDW 15.2 07/28/2020     07/28/2020       CMP:   Lab Results   Component Value Date     07/28/2020    K 4.0 07/28/2020    K 4.2 05/13/2020     07/28/2020    CO2 27 07/28/2020    BUN 22 07/28/2020    CREATININE 0.9 07/28/2020    GFRAA >60 07/28/2020    LABGLOM >60 07/28/2020    GLUCOSE 111 07/28/2020    PROT 7.3 05/07/2020    CALCIUM 9.3 07/28/2020    BILITOT 1.2 05/07/2020    ALKPHOS 98 05/07/2020    AST 26 05/07/2020    ALT 26 05/07/2020       POC Tests: No results for input(s): POCGLU, POCNA, POCK, POCCL, POCBUN, POCHEMO, POCHCT in the last 72 hours. Coags:   Lab Results   Component Value Date    PROTIME 12.4 05/12/2020    INR 1.07 05/12/2020       HCG (If Applicable): No results found for: PREGTESTUR, PREGSERUM, HCG, HCGQUANT     ABGs:   Lab Results   Component Value Date    PHART 7.293 05/12/2020    PO2ART 382.0 05/12/2020    OVC6OFW 57.3 05/12/2020    TGY4OQH 27.8 05/12/2020    BEART 1 05/12/2020    Z5RYNBLS 100 05/12/2020        Type & Screen (If Applicable):  No results found for: LABABO, LABRH    Drug/Infectious Status (If Applicable):  No results found for: HIV, HEPCAB    COVID-19 Screening (If Applicable):   Lab Results   Component Value Date    COVID19 Not Detected 07/21/2020           Anesthesia Evaluation  Patient summary reviewed and Nursing notes reviewed   history of anesthetic complications: PONV.   Airway: Mallampati: II  TM distance: >3 FB   Neck ROM: full  Mouth opening: > = 3 FB Dental: normal exam         Pulmonary:       (-) asthma and shortness of breath                           Cardiovascular:    (+) valvular problems/murmurs (s/p tavr 2020):, pacemaker: pacemaker, dysrhythmias: atrial fibrillation,     (-) hypertension and  angina          Echocardiogram reviewed               ROS comment: 7/21    Summary   *Left ventricle - normal size, mild concentric LVH, normal function with EF   of 60%   *Right ventricle - dilated   *Atria - dilated   *Mitral valve - annular calcification, mild regurgitation   *Aortic valve - A 26 mm Fallon Mateus 3 Ultra TAVR valve appears well   seated with a maximum velocity of 2.3 m/s, a mean gradient of 12 mmHg and an   EOA of 2.6 cm^2, no regurgitation   *Tricuspid valve - moderate regurgitation with PASP of 68mmHg        Neuro/Psych:      (-) CVA           GI/Hepatic/Renal:        (-) GERD and liver disease       Endo/Other:        (-) diabetes mellitus, hypothyroidism               Abdominal:             Vascular:     - PVD. Other Findings:           Anesthesia Plan      general     ASA 3       Induction: intravenous. MIPS: Postoperative opioids intended and Prophylactic antiemetics administered. Anesthetic plan and risks discussed with patient. Use of blood products discussed with patient whom. Plan discussed with CRNA.                   José Miguel Crowder MD   12/14/2021

## 2021-12-14 NOTE — H&P
Urology History and Physical  Inpatient Setting - [facility]    Provider: Basil Mike MD MD Patient ID:  Admission Date: 2021 Name: Homer Bush Date: n/a MRN: [id number]   Patient Location: OR/NONE : 1935  Attending: Basil Mike MD Date of Service: 2021  PCP: Rinaldo Epley, MD     Diagnoses:  BPH w obstruction    Assessment/Plan:  Continue to the OR as planned for Greenlight PVP. All the patients questions were answered in detail. He understands the plan as listed above.                                                                                                                                               _____________________________________________________________    CC: Pre-op    HPI: Rolanda Vicente is a 81 y.o. male. The history and physical exam was reviewed. The patient was seen and examined in pre-op. He had a chance to ask questions which were answered. There has been no interval changes. Plan to continue to the OR    Past Medical History:   He has a past medical history of Arthritis, Atrial fibrillation ([address] BPH (benign prostatic hyperplasia), and PONV (postoperative nausea and vomiting). Past Surgical History:  He has a past surgical history that includes hernia repair (Left); Hip Arthroplasty (Left); shoulder surgery (Left); Aortic valve replacement (N/A, 2020); Aortic valve replacement (N/A, 2020); joint replacement (Left); and pacemaker placement (). Allergies:   No Known Allergies    Social History:  He reports that he has quit smoking. His smoking use included cigars. He has quit using smokeless tobacco.  His smokeless tobacco use included chew. He reports current alcohol use of about 7.0 standard drinks of alcohol per week. He reports that he does not use drugs. Family History:  family history includes Diabetes in his mother.     Medications:   Scheduled Meds:   lidocaine PF  0.5 mL IntraDERmal Once    ciprofloxacin  400 mg IntraVENous On Call to OR    sodium chloride flush  10 mL IntraVENous 2 times per day     Continuous Infusions:   lactated ringers      lactated ringers      sodium chloride       PRN Meds:fentanNYL, HYDROmorphone, HYDROcodone 5 mg - acetaminophen, ondansetron, prochlorperazine, labetalol, hydrALAZINE, sodium chloride flush, sodium chloride, lidocaine PF    Review of Systems:  Constitutional: Negative for fever    Genitourinary: see HPI  Eyes: negative for sudden change in vision  EENT: no complaints  Cardiovascular: Negative for chest pain  Respiratory: Negative for shortness of breath  Gastrointestinal: Negative for nausea  Musculoskeletal: Negative for back pain   Neurological: Negative for weakness  Psychiatric: Negative for anxiety  Integumentary: Negative for rashes or adenopathy     Physical Exam:  Vitals:    12/14/21 1418   BP: (!) 175/76   Pulse: 63   Resp: 18   Temp: 97.1 °F (36.2 °C)   SpO2: 97%     Constitutional: NAD, well-developed, well-nourished. Cardiovascular: Regular rate. No peripheral edema  Respiratory: Respirations are even and non-labored. No audible breath sounds. Psychiatric: A + O x 3, normal affect. Insight appears intact.      Ivy Hi MD, MD  12/14/2021

## 2021-12-15 NOTE — PROGRESS NOTES
1900 Wife and daughter brought to bedside for DC instructions. Catheter emptying and care reviewed and understood. 1915 Pt dressed and DC to home.

## 2022-01-07 RX ORDER — RIVAROXABAN 20 MG/1
TABLET, FILM COATED ORAL
Qty: 30 TABLET | Refills: 10 | Status: SHIPPED | OUTPATIENT
Start: 2022-01-07

## 2022-01-07 NOTE — TELEPHONE ENCOUNTER
Received refill request for Xarelto from [employer]     Last ov: 07/15/2021 DCE    Last labs: 12/14/2021    Last Refill: 02/04/2021 #30 w/ 10 refills    Next appointment: 01/13/2022 DCE (recall)

## 2022-01-10 NOTE — PROGRESS NOTES
[address]  H+P // CONSULT // OP VISIT // Dallas Steward MD  ENC TYPE FU    CC HX HPI   GEN  Doing well. No new concerns. AS TAVR Denies cp, dizzy, syncope, palps. Mild chronic sob unchanged   AFIB  Persistent. Tolerating xarelto. MED  Compliant with CV meds listed below w/out reported sa. HISTORY/ALLLERGY/ROS   MedHx  has a past medical history of Arthritis, Atrial fibrillation ([address] BPH (benign prostatic hyperplasia), and PONV (postoperative nausea and vomiting). SurgHx  has a past surgical history that includes hernia repair (Left); Hip Arthroplasty (Left); shoulder surgery (Left); Aortic valve replacement (N/A, 5/12/2020); Aortic valve replacement (N/A, 5/12/2020); joint replacement (Left); pacemaker placement (2020); and TURP (N/A, 12/14/2021). SocHx  reports that he has quit smoking. His smoking use included cigars. He has quit using smokeless tobacco.  His smokeless tobacco use included chew. He reports current alcohol use of about 7.0 standard drinks of alcohol per week. He reports that he does not use drugs. FamHx family history includes Diabetes in his mother. Allerg Patient has no known allergies.    ROS [x]Full ROS obtained and negative except as mentioned in HPI      MEDICATIONS      Current Outpatient Medications   Medication Sig Dispense Refill    XARELTO 20 MG TABS tablet TAKE ONE TABLET BY MOUTH DAILY WITH BREAKFAST 30 tablet 10    senna-docusate (PERICOLACE) 8.6-50 MG per tablet Take 1 tablet by mouth 2 times daily 50 tablet 0    ampicillin (PRINCIPEN) 250 MG capsule Take 500 mg by mouth daily       finasteride (PROSCAR) 5 MG tablet Take 5 mg by mouth daily      Probiotic Product (PROBIOTIC DAILY PO) Take by mouth      hydroCHLOROthiazide (HYDRODIURIL) 25 MG tablet TAKE ONE TABLET BY MOUTH EVERY MORNING 90 tablet 2    Psyllium (METAMUCIL FIBER PO) Take by mouth      aspirin 81 MG EC tablet Take 1 tablet by mouth daily 30 tablet 3    Misc Natural Products (OSTEO BI-FLEX ADV JOINT SHIELD PO) Take by mouth       No current facility-administered medications for this visit. [x]Reviewed with patient and will remain unchanged except as mentioned in A/P    PHYSICAL EXAM   Vitals:    01/13/22 0856   BP: (!) 160/70   Pulse: 68   SpO2: 99%        Gen Alert, coop, no distress Heart  Rrr, 1/6   Head NC, AT, no abnorm Abd  Soft, NT, +BS, no mass, no OM   Eyes PER, conj/corn clear Ext  Ext nl, AT, no C/C/E   Nose Nares nl, no drain, NT Pulse 2+ and symmetric   Throat Lips, mucosa, tongue nl Skin Col/text/turg nl, no vis rash/les   Neck S/S, TM, NT, no bruit/JVD Psych Nl mood and affect   Lung CTA-B, unlabored, no DTP Lymph   No cervical or axillary LA   Ch wall NT, no deform Neuro  Nl gross M/S exam     ASSESSMENT AND PLAN     *AS   Date EF Detail   Sx   Sob, stable   Hx 5/20  TAVR 26 mm Fallon S3    NYHA   II   MPI 7/17 NL Small inferior scar v diaphragm   TTE 2/20  5/20  6/20  3/21  7/21 65%  60%  55%  60%  60% Sev AS MG 40, mod TR  TAVR well seated MG 11, mod TR  TAVR MG 17, Mod TR  TAVR MG 16, mod pulm htn, R atrium severely dilated  TAVR MG 12, mod TR   LHC 2/20  Normal Cors [facility])   Plan   S/p TAVR and doing well   *AFIB  Hx Persistent, CVS >1 , petra noted before and after surgery, MCOT 6/20-> [facility], 7/20 MICRA implant  Status On xarelto, pacing 92% of time  Plan Continue xarelto, interval interrogations of micra  *COMPLIANCE  Status Compliant  Plan Discussed importance of compliance with meds/diet/salt/exercise; avoid tob/alc/drugs; patient verbalized understanding  *FOLLOWUP  6 months with [address] William Alvarenga, am scribing for and in the presence of Felisa Cuevas MD.   SignedWilliam 01/10/22 12:20 PM   Provider Smith Hay is working as a scribe for and in the presence of me (Felisa Cuevas MD).   Working as a scribe, William Roach may have prepopulated components of this note with my historical  intellectual property under my direct supervision. Any additions to this intellectual property were performed in my presence and at my direction. Furthermore, the content and accuracy of this note have been reviewed by me Lawanda Goodman MD).  1/13/2022 7:17 AM    CODING   Category Diagnosis   Stable chronic illness  (94913/55543 - 2 or more) AS, AFIB   Chronic illness w/: Exac, progr or SA of Tx  (54864/47276 - 1 or more)    Time 30-39 minutes spent preparing to see patient including reviewing patient history/prior tests/prior consults, performing a medical exam, counseling and educating patient/family/caregiver, ordering medications/tests/procedures, referring and communicating with PCPs and other pertinent consultants, documenting information in the EMR, independently interpreting results and communicating to family and coordination of patient care.

## 2022-01-13 ENCOUNTER — OFFICE VISIT (OUTPATIENT)
Dept: CARDIOLOGY CLINIC | Age: 87
End: 2022-01-13
Payer: MEDICARE

## 2022-01-13 VITALS
DIASTOLIC BLOOD PRESSURE: 70 MMHG | HEART RATE: 68 BPM | HEIGHT: 67 IN | BODY MASS INDEX: 32.02 KG/M2 | SYSTOLIC BLOOD PRESSURE: 160 MMHG | OXYGEN SATURATION: 99 % | WEIGHT: 204 LBS

## 2022-01-13 DIAGNOSIS — Z95.2 S/P TAVR (TRANSCATHETER AORTIC VALVE REPLACEMENT): ICD-10-CM

## 2022-01-13 DIAGNOSIS — I48.19 PERSISTENT ATRIAL FIBRILLATION (HCC): ICD-10-CM

## 2022-01-13 DIAGNOSIS — I35.0 NONRHEUMATIC AORTIC VALVE STENOSIS: Primary | ICD-10-CM

## 2022-01-13 PROCEDURE — 4040F PNEUMOC VAC/ADMIN/RCVD: CPT | Performed by: INTERNAL MEDICINE

## 2022-01-13 PROCEDURE — G8417 CALC BMI ABV UP PARAM F/U: HCPCS | Performed by: INTERNAL MEDICINE

## 2022-01-13 PROCEDURE — G8428 CUR MEDS NOT DOCUMENT: HCPCS | Performed by: INTERNAL MEDICINE

## 2022-01-13 PROCEDURE — G8484 FLU IMMUNIZE NO ADMIN: HCPCS | Performed by: INTERNAL MEDICINE

## 2022-01-13 PROCEDURE — 1123F ACP DISCUSS/DSCN MKR DOCD: CPT | Performed by: INTERNAL MEDICINE

## 2022-01-13 PROCEDURE — 99214 OFFICE O/P EST MOD 30 MIN: CPT | Performed by: INTERNAL MEDICINE

## 2022-01-13 PROCEDURE — 1036F TOBACCO NON-USER: CPT | Performed by: INTERNAL MEDICINE

## 2022-02-10 NOTE — TELEPHONE ENCOUNTER
Received refill request for Hydrochlorothiazide from [address] [place] [address].     Last ov: 01/13/2022 DCE    Last labs: 12/14/2021 BMP    Last Refill: 05/03/2021 #90 w/ 2 refills    Next appointment: 07/14/2022 DCE

## 2022-02-11 RX ORDER — HYDROCHLOROTHIAZIDE 25 MG/1
TABLET ORAL
Qty: 90 TABLET | Refills: 2 | Status: SHIPPED | OUTPATIENT
Start: 2022-02-11

## 2022-02-28 ENCOUNTER — NURSE ONLY (OUTPATIENT)
Dept: CARDIOLOGY CLINIC | Age: 87
End: 2022-02-28
Payer: MEDICARE

## 2022-02-28 DIAGNOSIS — R00.1 BRADYCARDIA: ICD-10-CM

## 2022-02-28 DIAGNOSIS — Z95.0 PACEMAKER: ICD-10-CM

## 2022-03-02 PROCEDURE — 93296 REM INTERROG EVL PM/IDS: CPT | Performed by: INTERNAL MEDICINE

## 2022-03-02 PROCEDURE — 93294 REM INTERROG EVL PM/LDLS PM: CPT | Performed by: INTERNAL MEDICINE

## 2022-05-31 ENCOUNTER — NURSE ONLY (OUTPATIENT)
Dept: CARDIOLOGY CLINIC | Age: 87
End: 2022-05-31
Payer: MEDICARE

## 2022-05-31 DIAGNOSIS — R00.1 BRADYCARDIA: ICD-10-CM

## 2022-05-31 DIAGNOSIS — Z95.0 PACEMAKER: ICD-10-CM

## 2022-05-31 PROCEDURE — 93296 REM INTERROG EVL PM/IDS: CPT | Performed by: INTERNAL MEDICINE

## 2022-05-31 PROCEDURE — 93294 REM INTERROG EVL PM/LDLS PM: CPT | Performed by: INTERNAL MEDICINE

## 2022-05-31 NOTE — PROGRESS NOTES
We received remote transmission from patient's monitor at home. Transmission shows normal sensing and pacing function. EP physician will review. See interrogation under cardiology tab in the [address] [facility] [address] field for more details.

## 2022-07-26 NOTE — PROGRESS NOTES
[facility]  H+P  Consult  OP Visit  FU Visit   CC HX HPI   GEN  Doing well. No new concerns. AS TAVR Ø CP, SOB. AFIB Chronic No palpitations. MED  Compliant with CV meds. Ø reported SA. HISTORY/ALLERGY/ROS   MEDHx  has a past medical history of Arthritis, Atrial fibrillation ([address] BPH (benign prostatic hyperplasia), and PONV (postoperative nausea and vomiting). SURGHx  has a past surgical history that includes hernia repair (Left); Hip Arthroplasty (Left); shoulder surgery (Left); Aortic valve replacement (N/A, 5/12/2020); Aortic valve replacement (N/A, 5/12/2020); joint replacement (Left); pacemaker placement (2020); and TURP (N/A, 12/14/2021). SOCHx  reports that he has quit smoking. His smoking use included cigars. He has quit using smokeless tobacco.  His smokeless tobacco use included chew. He reports current alcohol use of about 7.0 standard drinks per week. He reports that he does not use drugs. FAMHx family history includes Diabetes in his mother. ALLERG Patient has no known allergies. ROS Full ROS obtained and negative except as mentioned in HPI   MEDICATIONS   Current Outpatient Medications   Medication Sig Dispense Refill    hydroCHLOROthiazide (HYDRODIURIL) 25 MG tablet TAKE ONE TABLET BY MOUTH EVERY MORNING 90 tablet 2    XARELTO 20 MG TABS tablet TAKE ONE TABLET BY MOUTH DAILY WITH BREAKFAST 30 tablet 10    senna-docusate (PERICOLACE) 8.6-50 MG per tablet Take 1 tablet by mouth 2 times daily 50 tablet 0    ampicillin (PRINCIPEN) 250 MG capsule Take 500 mg by mouth daily       finasteride (PROSCAR) 5 MG tablet Take 5 mg by mouth daily      Probiotic Product (PROBIOTIC DAILY PO) Take by mouth      Psyllium (METAMUCIL FIBER PO) Take by mouth      aspirin 81 MG EC tablet Take 1 tablet by mouth daily 30 tablet 3    Misc Natural Products (OSTEO BI-FLEX ADV JOINT SHIELD PO) Take by mouth       No current facility-administered medications for this visit.       PHYSICAL EXAM Vitals Vitals:    07/28/22 1024   BP: (!) 162/78   Pulse:    SpO2:       Gen Alert, coop, no distress Heart  Rrr, no mrg   Head NC, AT, no abnorm Abd  Soft, NT, +BS, no mass, no OM   Eyes PER, conj/corn clear Ext  Ext nl, AT, no C/C/E   Nose Nares nl, no drain, NT Pulse 2+ and symmetric   Throat Lips, mucosa, tongue nl Skin Col/text/turg nl, no vis rash/les   Neck S/S, TM, NT, no bruit/JVD Psych Nl mood and affect   Lung CTA-B, unlabored, no DTP Lymph   No cervical or axillary LA   Ch wall NT, no deform Neuro  Nl gross M/S exam      CODING   SCI (87841) - AS, AF  30-39 minutes preparing to see pt including review hx, tests, consults, perf exam, , educating pt, fam, caregiver, ordering meds/tests/procedures, referring and comm with pcps and other consultants, documenting info in EMR, interpreting results and communicating to fam and coordination of pt care.     ASSESSMENT AND PLAN     *AS   Date EF Detail   Sx   Sob, stable   Hx 5/20  TAVR 26 mm Fallon S3    NYHA   II   MPI 7/17 NL Small inferior scar v diaphragm   TTE 2/20  5/20  6/20  3/21  7/21  7/22 65%  60%  55%  60%  60% Sev AS MG 40, mod TR  TAVR well seated MG 11, mod TR  TAVR MG 17, Mod TR  TAVR MG 16, mod pulm htn, R atrium severely dilated  TAVR MG 12, mod TR   LHC 2/20  Normal Cors [facility])   Plan   S/p TAVR and doing well  Echo yearly   *AFIB  Hx Persistent, CVS >1 , petra noted before and after surgery, MCOT 6/20-> [facility], 7/20 MICRA implant  Status On xarelto, pacing 92% of time  Plan Continue xarelto, interval interrogations of micra  *COMPLIANCE  Status Compliant  Plan Discussed importance of compliance with meds/diet/salt/exercise; avoid tob/alc/drugs; patient verbalized understanding  *FOLLOWUP  6 months, echo yearly    [address] Mendy Alvarenga, am scribing for and in the presence of Shailesh Mata MD.   SignedMendy 07/26/22 11:34 AM   Provider Quirino Potts is working as a scribe for and in the presence of connie Mckee MD). Working as a scribe, Zachariah Beatty may have prepopulated components of this note with my historical  intellectual property under my direct supervision. Any additions to this intellectual property were performed in my presence and at my direction.   Furthermore, the content and accuracy of this note have been reviewed by connie Mckee MD).  7/28/2022 8:02 AM

## 2022-07-28 ENCOUNTER — HOSPITAL ENCOUNTER (OUTPATIENT)
Dept: NON INVASIVE DIAGNOSTICS | Age: 87
Discharge: HOME | End: 2022-07-28
Payer: MEDICARE

## 2022-07-28 ENCOUNTER — OFFICE VISIT (OUTPATIENT)
Dept: CARDIOLOGY CLINIC | Age: 87
End: 2022-07-28
Payer: MEDICARE

## 2022-07-28 ENCOUNTER — TELEPHONE (OUTPATIENT)
Dept: CARDIOLOGY CLINIC | Age: 87
End: 2022-07-28

## 2022-07-28 VITALS
HEART RATE: 63 BPM | BODY MASS INDEX: 32.9 KG/M2 | OXYGEN SATURATION: 98 % | WEIGHT: 209.6 LBS | HEIGHT: 67 IN | DIASTOLIC BLOOD PRESSURE: 78 MMHG | SYSTOLIC BLOOD PRESSURE: 162 MMHG

## 2022-07-28 DIAGNOSIS — Z95.2 S/P TAVR (TRANSCATHETER AORTIC VALVE REPLACEMENT): ICD-10-CM

## 2022-07-28 DIAGNOSIS — I48.19 PERSISTENT ATRIAL FIBRILLATION (HCC): ICD-10-CM

## 2022-07-28 DIAGNOSIS — I35.0 NONRHEUMATIC AORTIC VALVE STENOSIS: Primary | ICD-10-CM

## 2022-07-28 LAB
LV EF: 60 %
LVEF MODALITY: NORMAL

## 2022-07-28 PROCEDURE — G8427 DOCREV CUR MEDS BY ELIG CLIN: HCPCS | Performed by: INTERNAL MEDICINE

## 2022-07-28 PROCEDURE — G8417 CALC BMI ABV UP PARAM F/U: HCPCS | Performed by: INTERNAL MEDICINE

## 2022-07-28 PROCEDURE — 1123F ACP DISCUSS/DSCN MKR DOCD: CPT | Performed by: INTERNAL MEDICINE

## 2022-07-28 PROCEDURE — 1036F TOBACCO NON-USER: CPT | Performed by: INTERNAL MEDICINE

## 2022-07-28 PROCEDURE — 93306 TTE W/DOPPLER COMPLETE: CPT

## 2022-07-28 PROCEDURE — 99214 OFFICE O/P EST MOD 30 MIN: CPT | Performed by: INTERNAL MEDICINE

## 2022-07-28 NOTE — TELEPHONE ENCOUNTER
Don Thomas called wanting the office to know that the Pt had his 2 vaccine and the booster of Covid, he had a tetanus shot 2 yrs ago.   Please advise

## 2022-08-30 ENCOUNTER — NURSE ONLY (OUTPATIENT)
Dept: CARDIOLOGY CLINIC | Age: 87
End: 2022-08-30
Payer: MEDICARE

## 2022-08-30 DIAGNOSIS — R00.1 BRADYCARDIA: Primary | ICD-10-CM

## 2022-08-30 DIAGNOSIS — Z95.0 PACEMAKER: ICD-10-CM

## 2022-08-30 PROCEDURE — 93296 REM INTERROG EVL PM/IDS: CPT | Performed by: INTERNAL MEDICINE

## 2022-08-30 PROCEDURE — 93294 REM INTERROG EVL PM/LDLS PM: CPT | Performed by: INTERNAL MEDICINE

## 2022-11-08 RX ORDER — HYDROCHLOROTHIAZIDE 25 MG/1
TABLET ORAL
Qty: 90 TABLET | Refills: 2 | Status: SHIPPED | OUTPATIENT
Start: 2022-11-08

## 2022-12-01 ENCOUNTER — NURSE ONLY (OUTPATIENT)
Dept: CARDIOLOGY CLINIC | Age: 87
End: 2022-12-01

## 2022-12-01 DIAGNOSIS — I48.0 PAF (PAROXYSMAL ATRIAL FIBRILLATION) (HCC): ICD-10-CM

## 2022-12-01 DIAGNOSIS — R00.1 BRADYCARDIA: ICD-10-CM

## 2022-12-01 DIAGNOSIS — Z95.0 PACEMAKER: Primary | ICD-10-CM

## 2022-12-01 DIAGNOSIS — I48.20 CHRONIC ATRIAL FIBRILLATION (HCC): ICD-10-CM

## 2022-12-01 DIAGNOSIS — R00.1 SYMPTOMATIC BRADYCARDIA: ICD-10-CM

## 2022-12-01 DIAGNOSIS — I35.0 NONRHEUMATIC AORTIC VALVE STENOSIS: ICD-10-CM

## 2022-12-01 NOTE — PROGRESS NOTES
Patient comes in for programming evaluation for his pacemaker. All sensing and pacing parameters are within normal range. Battery life > 8 years.  95.1%. No changes need to be made at this time. Please see interrogation for more detail. Patient will follow up in 3 months in office or remotely.

## 2022-12-19 RX ORDER — RIVAROXABAN 20 MG/1
TABLET, FILM COATED ORAL
Qty: 30 TABLET | Refills: 10 | Status: SHIPPED | OUTPATIENT
Start: 2022-12-19

## 2023-03-01 NOTE — PROGRESS NOTES
[facility]   Electrophysiology Follow up   Date: 3/2/2023  I had the privilege of visiting Vineet Zimmer in the office. CC: Pacemaker, shortness of breath  HPI: Vineet Zimmer is a 81 y.o. male with permanent Atrial fibrillation, s/p TAVR 5/2020, noted to have bradycardia while in hospital.  He has intermittent LBBB. A monitor showed HR down to 30's. He has some fatigue and shortness of breath with activity. s/p MICRA  PPM 7/28/2020    Interval History:  He is feeling fine. He has been having shortness of breath chronically. Assessment and plan:   Symptomatic Bradycardia   -s/p MICRA  PPM 7/28/2020  -The CIED was interrogated and programmed and I supervised and reviewed all the data. All findings and changes are in device interrogation sheet and reflect my personal interpretation and changes and is scanned to Epic. 8y years remaining,   97.5%      Needs annual echo due to high percentage of ventricular pacing. Permanent Atrial Fibrillation    -ECG today shows V paced. Atrial fibrillation    -Patient has a [id number] Score of at least 2 (age4) Continue Xarelto 20 mg daily, CrCl 102 mL/min per Cr of 0.7 on 12/14/2021    Aortic Stenosis   -S/p TAVR 5/2020   -Managed by     Obesity  Body mass index is 32.61 kg/m². -Excessive weight is complicating assessment and treatment. It is placing patient at risk for multiple co-morbidities as well as early death and contributing to the patient's presentation.  -Discussed weight management with diet and exercise      Hypertension    Blood pressure is elevated. However he states that at home it is normal.  Continue hydrochlorothiazide. Shortness of breath is chronic and unchanged. He will have an echo done to assess his valve and LV function.          Plan:   Schedule echo  See Dr. Lincoln Roberts next available  1 year NPSR and device   Patient Active Problem List    Diagnosis Date Noted    BPH with obstruction/lower urinary tract symptoms 12/14/2021    Pacemaker 07/28/2020    Bradycardia 07/28/2020    LBBB (left bundle branch block) 07/28/2020    Symptomatic bradycardia     S/P TAVR (transcatheter aortic valve replacement) 05/19/2020    Chronic atrial fibrillation 07/29/2019    PAF (paroxysmal atrial fibrillation) ([facility] 07/17/2018    Persistent atrial fibrillation ([facility] 07/17/2018    Nonrheumatic aortic valve stenosis 07/21/2017     Diagnostic studies:   ECG 3/2/23  /AFIB, V paced      Echo 7/28/2022  *Left ventricle - normal size, moderate concentric LVH, normal function with EF of 60%  *Right ventricle - dilated, normal function  *Aortic valve - well seated TAVR valve (26mm ES3U), PV 2.58m/s, EOA 2.27cm2, no regurgitation  *Mitral valve - thickened, mild regurgitation  *Tricuspid valve - moderate regurgitation with PASP of 64mmHg      Echo: 6/2020   Normal left ventricle size, wall thickness and systolic function with an estimated ejection fraction of 55%. No regional wall motion abnormalities are seen. Moderate tricuspid regurgitation. PASP 59mmHg. PHTN. GXT: 7/2017  Small sized inferior fixed defect of mild intensity consistent with    diaphragmatic artifact. A small area of fibrosis is not excluded. No ischemia. Normal LV function. Overall findings represent a low risk scan      I independently reviewed the cardiac diagnostic studies, ECG and relevant imaging studies. Lab Results   Component Value Date    LVEF 60 07/28/2022    LVEFMODE Echo 05/13/2020     No results found for: TSHFT4, TSH      Physical Examination:  Vitals:    03/02/23 0857   BP: (!) 162/90   Pulse:    SpO2:       Wt Readings from Last 3 Encounters:   03/02/23 208 lb 3.2 oz (94.4 kg)   07/28/22 209 lb 9.6 oz (95.1 kg)   01/13/22 204 lb (92.5 kg)       Constitutional: Oriented. No distress. Head: Normocephalic and atraumatic. Mouth/Throat: Oropharynx is clear and moist.   Eyes: Conjunctivae normal. EOM are normal.   Neck: Neck supple. No rigidity. No JVD present. Cardiovascular: Normal rate, regular rhythm, S1&S2. Pulmonary/Chest: Bilateral respiratory sounds. No wheezes, No rhonchi. Abdominal: Soft. Bowel sounds present. No distension, No tenderness. Musculoskeletal: No tenderness. No edema    Lymphadenopathy: Has no cervical adenopathy. Neurological: Alert and oriented. Cranial nerve appears intact, No Gross deficit   Skin: Skin is warm and dry. No rash noted. Psychiatric: Has a normal behavior       Review of System:  [x] Full ROS obtained and negative except as mentioned in HPI    Prior to Admission medications    Medication Sig Start Date End Date Taking? Authorizing Provider   XARELTO 20 MG TABS tablet TAKE ONE TABLET BY MOUTH DAILY WITH BREAKFAST 12/19/22  Yes Michael Caceres MD   hydroCHLOROthiazide (HYDRODIURIL) 25 MG tablet TAKE ONE TABLET BY MOUTH EVERY MORNING 11/8/22  Yes Michael Caceres MD   ampicillin (PRINCIPEN) 250 MG capsule Take 500 mg by mouth daily    Yes Historical Provider, MD   finasteride (PROSCAR) 5 MG tablet Take 5 mg by mouth daily   Yes Historical Provider, MD   Probiotic Product (PROBIOTIC DAILY PO) Take by mouth   Yes Historical Provider, MD   Psyllium (METAMUCIL FIBER PO) Take by mouth   Yes Historical Provider, MD   aspirin 81 MG EC tablet Take 1 tablet by mouth daily 5/13/20  Yes Michael Caceres MD   Misc Natural Products (OSTEO BI-FLEX ADV JOINT SHIELD PO) Take by mouth   Yes Historical Provider, MD       No Known Allergies    Social History:  Reviewed. reports that he has quit smoking. His smoking use included cigars. He has quit using smokeless tobacco.  His smokeless tobacco use included chew. He reports current alcohol use of about 7.0 standard drinks per week. He reports that he does not use drugs. Family History:  Reviewed. Reviewed. No family history of SCD. Relevant and available labs, and cardiovascular diagnostics reviewed. Reviewed.      I independently reviewed relevant and available cardiac diagnostic tests ECG, CXR, Echo, Stress test, Device interrogation, Holter, CT scan. Outside medical records via Care everywhere reviewed and summarized in H&P above. Complex medical condition with multiple medical problems affecting prognosis and outcome of EP interventions       - The patient is counseled to follow a low salt diet to assure blood pressure remains controlled for cardiovascular risk factor modification.   - The patient is counseled to avoid excess caffeine, and energy drinks as this may exacerbated ectopy and arrhythmia. - The patient is counseled to get regular exercise 3-5 times per week to control cardiovascular risk factors. - The patient is counseled to lose weigt to control cardiovascular risk factors. - The patient is counseled to avoid tobacco use. All questions and concerns were addressed to the patient/family. Alternatives to my treatment were discussed. I have discussed the above stated plan and the patient verbalized understanding and agreed with the plan. Scribe attestation: This note was scribed in the presence of Elizabeth Barber MD by Marlena Mo RN         NOTE: This report was transcribed using voice recognition software. Every effort was made to ensure accuracy, however, inadvertent computerized transcription errors may be present.      Elizabeth Barber MD, [facility] [address] [facility]   Office: [phone]  Fax: [phone]

## 2023-03-02 ENCOUNTER — NURSE ONLY (OUTPATIENT)
Dept: CARDIOLOGY CLINIC | Age: 88
End: 2023-03-02

## 2023-03-02 ENCOUNTER — OFFICE VISIT (OUTPATIENT)
Dept: CARDIOLOGY CLINIC | Age: 88
End: 2023-03-02

## 2023-03-02 VITALS
SYSTOLIC BLOOD PRESSURE: 162 MMHG | HEIGHT: 67 IN | WEIGHT: 208.2 LBS | HEART RATE: 62 BPM | BODY MASS INDEX: 32.68 KG/M2 | DIASTOLIC BLOOD PRESSURE: 90 MMHG | OXYGEN SATURATION: 96 %

## 2023-03-02 DIAGNOSIS — I48.0 PAF (PAROXYSMAL ATRIAL FIBRILLATION) (HCC): ICD-10-CM

## 2023-03-02 DIAGNOSIS — Z95.0 PACEMAKER: ICD-10-CM

## 2023-03-02 DIAGNOSIS — I35.0 NONRHEUMATIC AORTIC VALVE STENOSIS: ICD-10-CM

## 2023-03-02 DIAGNOSIS — I10 BENIGN ESSENTIAL HTN: ICD-10-CM

## 2023-03-02 DIAGNOSIS — Z95.2 S/P TAVR (TRANSCATHETER AORTIC VALVE REPLACEMENT): ICD-10-CM

## 2023-03-02 DIAGNOSIS — R00.1 SYMPTOMATIC BRADYCARDIA: Primary | ICD-10-CM

## 2023-03-02 DIAGNOSIS — R00.1 BRADYCARDIA: ICD-10-CM

## 2023-03-02 DIAGNOSIS — R00.1 SYMPTOMATIC BRADYCARDIA: ICD-10-CM

## 2023-03-02 DIAGNOSIS — I48.20 CHRONIC ATRIAL FIBRILLATION (HCC): Primary | ICD-10-CM

## 2023-03-02 DIAGNOSIS — I48.20 CHRONIC ATRIAL FIBRILLATION (HCC): ICD-10-CM

## 2023-03-02 NOTE — PROGRESS NOTES
Patient comes in for programming evaluation for his pacemaker. All sensing and pacing parameters are within normal range. Battery life > 8 years. Presenting  at 70 bpm   97.5%. Underlying VS @32 bpm  Patient has chronic AF. Patient remains on Xarelto. No changes need to be made at this time. Please see interrogation for more detail. Patient will see Dr. Xavier Valencia follow up in 3 months in office or remotely.

## 2023-06-01 NOTE — PROGRESS NOTES
information and plan with family. SCRIBE ATTESTATION   Nurse I, Mary Veronica, am scribing for and in the presence of Judith Palacios MD.   Signed, Mary Veronica RN. 6/1/2023 11:42 AM    Doctor Mary Veronica is working as scribe for and in presence of me and may have prepopulated components of note with my historical intellectual property (IP) under my supervision. Any additions to this IP performed in my presence and at my direction. Content and accuracy of this note reviewed by me Judith Palacios MD).    ASSESSMENT AND PLAN   *AS    Date EF Detail   Sx     Sob, stable   Hx 5/20   TAVR 26 mm Fallon S3    NYHA     II   TTE 2/20  5/20  6/20  3/21  7/21  7/22 65%  60%  55%  60%  60%  60% Sev AS MG 40, mod TR  TAVR MG 11, mod TR  TAVR MG 17, Mod TR  TAVR MG 16, mod pulm htn, R atrium severely dilated  TAVR MG 12, mod TR  TAVR MG 14, mod TR   LHC 2/20   Normal Cors [place] [facility])   Plan     S/p TAVR and doing well  Echo yearly   *AF  Status Persistent, Micra PPM   Plan Continue xarelto, interval interrogations of micra    *HTN  Status Advice Plan   Controlled Diet/salt/xcise/wt counseling Continue meds at doses above   *FOLLOWUP  12 months

## 2023-06-26 ENCOUNTER — TELEPHONE (OUTPATIENT)
Dept: CARDIOLOGY | Age: 88
End: 2023-06-26

## 2023-07-31 ENCOUNTER — TELEPHONE (OUTPATIENT)
Dept: CARDIOLOGY CLINIC | Age: 88
End: 2023-07-31

## 2023-07-31 NOTE — TELEPHONE ENCOUNTER
Attempted to call. No answer.  Please see if the patient left already or do they still need an order

## 2023-07-31 NOTE — TELEPHONE ENCOUNTER
Quiana Fowler states pt is there now for echo and asking if mxa would give order for contrast so they can see the squeeze of the heart better. Please call to advise.

## 2023-08-17 ENCOUNTER — OFFICE VISIT (OUTPATIENT)
Dept: CARDIOLOGY CLINIC | Age: 88
End: 2023-08-17
Payer: MEDICARE

## 2023-08-17 ENCOUNTER — NURSE ONLY (OUTPATIENT)
Dept: CARDIOLOGY CLINIC | Age: 88
End: 2023-08-17
Payer: MEDICARE

## 2023-08-17 ENCOUNTER — TELEPHONE (OUTPATIENT)
Dept: CARDIOLOGY CLINIC | Age: 88
End: 2023-08-17

## 2023-08-17 VITALS
HEART RATE: 62 BPM | HEIGHT: 67 IN | SYSTOLIC BLOOD PRESSURE: 132 MMHG | BODY MASS INDEX: 31.36 KG/M2 | DIASTOLIC BLOOD PRESSURE: 56 MMHG | WEIGHT: 199.8 LBS | OXYGEN SATURATION: 97 %

## 2023-08-17 DIAGNOSIS — I48.20 CHRONIC ATRIAL FIBRILLATION (HCC): ICD-10-CM

## 2023-08-17 DIAGNOSIS — Z95.0 PACEMAKER: ICD-10-CM

## 2023-08-17 DIAGNOSIS — E66.9 CLASS 1 OBESITY WITH BODY MASS INDEX (BMI) OF 32.0 TO 32.9 IN ADULT, UNSPECIFIED OBESITY TYPE, UNSPECIFIED WHETHER SERIOUS COMORBIDITY PRESENT: ICD-10-CM

## 2023-08-17 DIAGNOSIS — Z95.2 S/P TAVR (TRANSCATHETER AORTIC VALVE REPLACEMENT): ICD-10-CM

## 2023-08-17 DIAGNOSIS — R00.1 BRADYCARDIA: ICD-10-CM

## 2023-08-17 DIAGNOSIS — I35.0 AORTIC VALVE STENOSIS, NONRHEUMATIC: Primary | ICD-10-CM

## 2023-08-17 DIAGNOSIS — Z95.0 PACEMAKER: Primary | ICD-10-CM

## 2023-08-17 PROCEDURE — 99214 OFFICE O/P EST MOD 30 MIN: CPT | Performed by: INTERNAL MEDICINE

## 2023-08-17 PROCEDURE — G8427 DOCREV CUR MEDS BY ELIG CLIN: HCPCS | Performed by: INTERNAL MEDICINE

## 2023-08-17 PROCEDURE — G8417 CALC BMI ABV UP PARAM F/U: HCPCS | Performed by: INTERNAL MEDICINE

## 2023-08-17 PROCEDURE — 1123F ACP DISCUSS/DSCN MKR DOCD: CPT | Performed by: INTERNAL MEDICINE

## 2023-08-17 PROCEDURE — 1036F TOBACCO NON-USER: CPT | Performed by: INTERNAL MEDICINE

## 2023-08-17 PROCEDURE — 93279 PRGRMG DEV EVAL PM/LDLS PM: CPT | Performed by: INTERNAL MEDICINE

## 2023-08-17 NOTE — TELEPHONE ENCOUNTER
Marisel, can you put Mr. Bennett Ramirez on your recall list for an echo and OV w DCE in 8/2024 emily TAVR spot?  Thanks, TARAH Cheung RN

## 2023-11-13 NOTE — TELEPHONE ENCOUNTER
Last OV: 08/17/23 DCE  Next OV: 08/15/24 DCE  Last Labs: CBC 11/29/22 Care Everywhere  Last Fill:   XARELTO 20 MG TABS tablet 30 tablet 10 12/19/2022     Sig: TAKE ONE TABLET BY MOUTH DAILY WITH BREAKFAST    Sent to pharmacy as: Xarelto 20 MG Oral Tablet (rivaroxaban)    Cosign for Ordering: Accepted by Lily Cristobal MD on 12/20/2022  8:16 AM    E-Prescribing Status: Receipt confirmed by pharmacy (12/19/2022  1:31 PM EST)

## 2023-11-14 RX ORDER — RIVAROXABAN 20 MG/1
TABLET, FILM COATED ORAL
Qty: 30 TABLET | Refills: 10 | Status: SHIPPED | OUTPATIENT
Start: 2023-11-14

## 2023-11-22 PROCEDURE — 93296 REM INTERROG EVL PM/IDS: CPT | Performed by: INTERNAL MEDICINE

## 2023-11-22 PROCEDURE — 93294 REM INTERROG EVL PM/LDLS PM: CPT | Performed by: INTERNAL MEDICINE

## 2024-02-05 PROBLEM — I48.0 PAF (PAROXYSMAL ATRIAL FIBRILLATION) (HCC): Status: RESOLVED | Noted: 2018-07-17 | Resolved: 2024-02-05

## 2024-02-05 NOTE — PROGRESS NOTES
[facility]   Electrophysiology  Johny Pardo, APRN-CNP  Attending EP: Dr. Chacon    Date: 3/6/2024  I had the privilege of visiting James Candelario in the office.     Chief Complaint:   Chief Complaint   Patient presents with    Follow-up     Yearly    Shortness of Breath     ongoing    Device Check     History of Present Illness: History obtained from patient and medical record.    James Candelario is 88 y.o. male with a past medical history of permanent atrial fibrillation, and aortic stenosis s/p TAVR (5/20). Noted to be bradycardic following the procedure with HR down to 30s. S/p Micra Medtronic PPM placement (7/28/20)    -Interval history: Today, James Candelario is being seen for routine follow up. He is doing well. His device is functioning normally. 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     Denies having chest pain, palpitations, shortness of breath, orthopnea/PND, cough, or dizziness at the time of this visit.    With regard to medication therapy the patient has been compliant with prescribed regimen. They have tolerated therapy to date.     Allergies:  No Known Allergies    Home Medications:  Prior to Visit Medications    Medication Sig Taking? Authorizing Provider   XARELTO 20 MG TABS tablet TAKE ONE TABLET BY MOUTH DAILY WITH BREAKFAST Yes Marcelo Trujillo MD   Cholecalciferol (VITAMIN D3 PO) Take by mouth Yes Cristobal Lopez MD   hydroCHLOROthiazide (HYDRODIURIL) 25 MG tablet TAKE ONE TABLET BY MOUTH EVERY MORNING Yes Marcelo Trujillo MD   Probiotic Product (PROBIOTIC DAILY PO) Take by mouth Yes Cristobal Lopez MD   Psyllium (METAMUCIL FIBER PO) Take by mouth Yes Cristobal Lopez MD   aspirin 81 MG EC tablet Take 1 tablet by mouth daily Yes Marcelo Trujillo MD   [facility] Natural Products (OSTEO BI-FLEX ADV JOINT SHIELD PO) Take by mouth Yes Cristobal Lopez MD      Past Medical History:  Past Medical History:   Diagnosis Date    Arthritis

## 2024-02-23 PROCEDURE — 93294 REM INTERROG EVL PM/LDLS PM: CPT | Performed by: INTERNAL MEDICINE

## 2024-02-23 PROCEDURE — 93296 REM INTERROG EVL PM/IDS: CPT | Performed by: INTERNAL MEDICINE

## 2024-03-06 ENCOUNTER — NURSE ONLY (OUTPATIENT)
Dept: CARDIOLOGY CLINIC | Age: 89
End: 2024-03-06
Payer: MEDICARE

## 2024-03-06 ENCOUNTER — OFFICE VISIT (OUTPATIENT)
Dept: CARDIOLOGY CLINIC | Age: 89
End: 2024-03-06
Payer: MEDICARE

## 2024-03-06 VITALS
HEIGHT: 67 IN | OXYGEN SATURATION: 98 % | BODY MASS INDEX: 30.92 KG/M2 | HEART RATE: 66 BPM | WEIGHT: 197 LBS | DIASTOLIC BLOOD PRESSURE: 72 MMHG | SYSTOLIC BLOOD PRESSURE: 136 MMHG

## 2024-03-06 DIAGNOSIS — Z95.0 PACEMAKER: Primary | ICD-10-CM

## 2024-03-06 DIAGNOSIS — R00.1 BRADYCARDIA: ICD-10-CM

## 2024-03-06 DIAGNOSIS — Z95.2 S/P TAVR (TRANSCATHETER AORTIC VALVE REPLACEMENT): ICD-10-CM

## 2024-03-06 DIAGNOSIS — I35.0 NONRHEUMATIC AORTIC VALVE STENOSIS: ICD-10-CM

## 2024-03-06 DIAGNOSIS — I44.7 LBBB (LEFT BUNDLE BRANCH BLOCK): ICD-10-CM

## 2024-03-06 DIAGNOSIS — I48.20 CHRONIC ATRIAL FIBRILLATION (HCC): ICD-10-CM

## 2024-03-06 DIAGNOSIS — I48.19 PERSISTENT ATRIAL FIBRILLATION (HCC): ICD-10-CM

## 2024-03-06 PROCEDURE — G8427 DOCREV CUR MEDS BY ELIG CLIN: HCPCS | Performed by: NURSE PRACTITIONER

## 2024-03-06 PROCEDURE — 93279 PRGRMG DEV EVAL PM/LDLS PM: CPT | Performed by: INTERNAL MEDICINE

## 2024-03-06 PROCEDURE — G8417 CALC BMI ABV UP PARAM F/U: HCPCS | Performed by: NURSE PRACTITIONER

## 2024-03-06 PROCEDURE — 93000 ELECTROCARDIOGRAM COMPLETE: CPT | Performed by: NURSE PRACTITIONER

## 2024-03-06 PROCEDURE — 1123F ACP DISCUSS/DSCN MKR DOCD: CPT | Performed by: NURSE PRACTITIONER

## 2024-03-06 PROCEDURE — G8484 FLU IMMUNIZE NO ADMIN: HCPCS | Performed by: NURSE PRACTITIONER

## 2024-03-06 PROCEDURE — 1036F TOBACCO NON-USER: CPT | Performed by: NURSE PRACTITIONER

## 2024-03-06 PROCEDURE — 99214 OFFICE O/P EST MOD 30 MIN: CPT | Performed by: NURSE PRACTITIONER

## 2024-03-22 NOTE — PROGRESS NOTES
Patient comes in for programming evaluation for his pacemaker. All sensing and pacing parameters are within normal range. Battery life > 8 years.  91.8%. No changes need to be made at this time. Please see interrogation for more detail. Patient will follow up in 3 months in office or remotely.
What Is The Reason For Today's Visit?: History of Non-Melanoma Skin Cancer
How Many Skin Cancers Have You Had?: more than one
When Was Your Last Cancer Diagnosed?: 2023

## 2024-08-12 NOTE — PATIENT INSTRUCTIONS
Track your blood pressure at home around noon every day. Call if the top number is consistently around 160 or over    Start taking lasix (water pill) daily to help with your swelling and blood pressure    Watch your salt intake   Detail Level: Detailed Quality 130: Documentation Of Current Medications In The Medical Record: Current Medications Documented Quality 226: Preventive Care And Screening: Tobacco Use: Screening And Cessation Intervention: Patient screened for tobacco use and is an ex/non-smoker Quality 431: Preventive Care And Screening: Unhealthy Alcohol Use - Screening: Patient not identified as an unhealthy alcohol user when screened for unhealthy alcohol use using a systematic screening method Quality 431: Preventive Care And Screening: Unhealthy Alcohol Use - Screening: Patient screened for unhealthy alcohol use using a single question and scores less than 2 times per year

## 2024-08-12 NOTE — PROGRESS NOTES
[facility]  H+P  Consult  OP Visit  FU Visit   CC/HPI   CC Followup visit for cardiac conditions detailed in assessment and plan below.   Intervention TAVR   General Doing well.  No new concerns.     Cardiac Sx -CP, -SOB, -dizziness, -syncope, -orthopnea, -pnd, -fatigue, +edema   HISTORY/ALLERGY/ROS   M/S/S/F Hx Reviewed in Epic and updated as appropriate   ALLERG Patient has no known allergies.   ROS Full ROS obtained and negative except as mentioned in HPI   MEDICATIONS   Cardiac medications reviewed in Epic during visit with patient.   PHYSICAL EXAM   Vitals BP (!) 158/82 (Site: Left Upper Arm, Position: Sitting, Cuff Size: Medium Adult)   Pulse 53   Ht 1.702 m (5' 7\")   Wt 90.4 kg (199 lb 3.2 oz)   SpO2 99%   BMI 31.20 kg/m²    Gen Alert, coop, no distress Heart  RRR, 1/6   Lung CTA-B, unlabored, no DTP Extrem Edema -Grade 2 (4mm)      COMPLIANCE   Discussed and counseled on diet, exercise, weight loss, smoking, alcohol, drugs.  All questions answered.   CODING   SCI ([id number]) - 30-39 mins spent reviewing hx/tests/consults, performing exam, counseling/educating, ordering meds/tests/procedures, referring/communicating w/PCP/consultants, documenting in EMR, interpreting results, communicating medical information and plan with family.   SCRIBE ATTESTATION   Nurse I, Lori Webber RN, am scribing for and in the presence of Marcelo Trujillo MD. 8/12/2024 12:45 PM EDT   Doctor Lori Webber is working as scribe for and in presence of me and may have prepopulated components of note with my historical intellectual property (IP) under my supervision.  Any additions to this IP performed in my presence and at my direction. Content and accuracy of this note reviewed by me (Marcelo Trujillo MD).   NEW MEDICATIONS   Pt verbalizes understanding of the need for treatment and education provided at today's visit.  Additional education provided in AVS.   ASSESSMENT AND PLAN   *AS    Date EF Detail   Sx     Sob, stable

## 2024-08-15 ENCOUNTER — HOSPITAL ENCOUNTER (OUTPATIENT)
Age: 89
Discharge: HOME | End: 2024-08-17
Attending: INTERNAL MEDICINE
Payer: MEDICARE

## 2024-08-15 ENCOUNTER — OFFICE VISIT (OUTPATIENT)
Dept: CARDIOLOGY CLINIC | Age: 89
End: 2024-08-15
Payer: MEDICARE

## 2024-08-15 VITALS
BODY MASS INDEX: 31.27 KG/M2 | WEIGHT: 199.2 LBS | DIASTOLIC BLOOD PRESSURE: 82 MMHG | OXYGEN SATURATION: 99 % | SYSTOLIC BLOOD PRESSURE: 158 MMHG | HEART RATE: 53 BPM | HEIGHT: 67 IN

## 2024-08-15 VITALS
SYSTOLIC BLOOD PRESSURE: 136 MMHG | DIASTOLIC BLOOD PRESSURE: 72 MMHG | BODY MASS INDEX: 30.92 KG/M2 | HEIGHT: 67 IN | WEIGHT: 197 LBS

## 2024-08-15 DIAGNOSIS — Z95.2 S/P TAVR (TRANSCATHETER AORTIC VALVE REPLACEMENT): ICD-10-CM

## 2024-08-15 DIAGNOSIS — I10 ESSENTIAL HYPERTENSION: ICD-10-CM

## 2024-08-15 DIAGNOSIS — I35.0 AORTIC VALVE STENOSIS, NONRHEUMATIC: Primary | ICD-10-CM

## 2024-08-15 DIAGNOSIS — Z95.2 HISTORY OF TRANSCATHETER AORTIC VALVE REPLACEMENT (TAVR): ICD-10-CM

## 2024-08-15 DIAGNOSIS — I48.19 PERSISTENT ATRIAL FIBRILLATION (HCC): ICD-10-CM

## 2024-08-15 LAB
ECHO AO ASC DIAM: 3.8 CM
ECHO AO ASCENDING AORTA INDEX: 1.88 CM/M2
ECHO AO ROOT DIAM: 3.6 CM
ECHO AO ROOT INDEX: 1.78 CM/M2
ECHO AV AREA PEAK VELOCITY: 2.3 CM2
ECHO AV AREA VTI: 2.2 CM2
ECHO AV AREA/BSA PEAK VELOCITY: 1.1 CM2/M2
ECHO AV AREA/BSA VTI: 1.1 CM2/M2
ECHO AV MEAN GRADIENT: 12 MMHG
ECHO AV MEAN VELOCITY: 1.6 M/S
ECHO AV PEAK GRADIENT: 21 MMHG
ECHO AV PEAK VELOCITY: 2.3 M/S
ECHO AV VELOCITY RATIO: 0.43
ECHO AV VTI: 46.6 CM
ECHO BSA: 2.06 M2
ECHO EST RA PRESSURE: 15 MMHG
ECHO IVC EXP: 3.1 CM
ECHO IVC INSP: 2.5 CM
ECHO LA AREA 2C: 36.6 CM2
ECHO LA AREA 4C: 32.2 CM2
ECHO LA DIAMETER INDEX: 3.37 CM/M2
ECHO LA DIAMETER: 6.8 CM
ECHO LA MAJOR AXIS: 7.8 CM
ECHO LA MINOR AXIS: 7.9 CM
ECHO LA TO AORTIC ROOT RATIO: 1.89
ECHO LA VOL BP: 123 ML (ref 18–58)
ECHO LA VOL MOD A2C: 139 ML (ref 18–58)
ECHO LA VOL MOD A4C: 109 ML (ref 18–58)
ECHO LA VOL/BSA BIPLANE: 61 ML/M2 (ref 16–34)
ECHO LA VOLUME INDEX MOD A2C: 69 ML/M2 (ref 16–34)
ECHO LA VOLUME INDEX MOD A4C: 54 ML/M2 (ref 16–34)
ECHO LV E' LATERAL VELOCITY: 12 CM/S
ECHO LV E' SEPTAL VELOCITY: 10 CM/S
ECHO LV EDV A2C: 123 ML
ECHO LV EDV A4C: 116 ML
ECHO LV EDV INDEX A4C: 57 ML/M2
ECHO LV EDV NDEX A2C: 61 ML/M2
ECHO LV EJECTION FRACTION A2C: 53 %
ECHO LV EJECTION FRACTION A4C: 58 %
ECHO LV EJECTION FRACTION BIPLANE: 55 % (ref 55–100)
ECHO LV ESV A2C: 58 ML
ECHO LV ESV A4C: 49 ML
ECHO LV ESV INDEX A2C: 29 ML/M2
ECHO LV ESV INDEX A4C: 24 ML/M2
ECHO LV FRACTIONAL SHORTENING: 32 % (ref 28–44)
ECHO LV INTERNAL DIMENSION DIASTOLE INDEX: 2.62 CM/M2
ECHO LV INTERNAL DIMENSION DIASTOLIC: 5.3 CM (ref 4.2–5.9)
ECHO LV INTERNAL DIMENSION SYSTOLIC INDEX: 1.78 CM/M2
ECHO LV INTERNAL DIMENSION SYSTOLIC: 3.6 CM
ECHO LV IVSD: 1 CM (ref 0.6–1)
ECHO LV MASS 2D: 213.9 G (ref 88–224)
ECHO LV MASS INDEX 2D: 105.9 G/M2 (ref 49–115)
ECHO LV POSTERIOR WALL DIASTOLIC: 1.1 CM (ref 0.6–1)
ECHO LV RELATIVE WALL THICKNESS RATIO: 0.42
ECHO LVOT AREA: 5.3 CM2
ECHO LVOT AV VTI INDEX: 0.42
ECHO LVOT DIAM: 2.6 CM
ECHO LVOT MEAN GRADIENT: 2 MMHG
ECHO LVOT PEAK GRADIENT: 4 MMHG
ECHO LVOT PEAK VELOCITY: 1 M/S
ECHO LVOT STROKE VOLUME INDEX: 51.5 ML/M2
ECHO LVOT SV: 104 ML
ECHO LVOT VTI: 19.6 CM
ECHO MV E VELOCITY: 1.34 M/S
ECHO MV E/E' LATERAL: 11.17
ECHO MV E/E' RATIO (AVERAGED): 12.28
ECHO MV E/E' SEPTAL: 13.4
ECHO PV MAX VELOCITY: 1 M/S
ECHO PV PEAK GRADIENT: 4 MMHG
ECHO RA AREA 4C: 31.3 CM2
ECHO RA END SYSTOLIC VOLUME APICAL 4 CHAMBER INDEX BSA: 51 ML/M2
ECHO RA VOLUME: 104 ML
ECHO RIGHT VENTRICULAR SYSTOLIC PRESSURE (RVSP): 76 MMHG
ECHO RV BASAL DIMENSION: 5.4 CM
ECHO RV LONGITUDINAL DIMENSION: 8.5 CM
ECHO RV MID DIMENSION: 4.1 CM
ECHO TV MAX VELOCITY: 4 M/S
ECHO TV REGURGITANT MAX VELOCITY: 3.9 M/S
ECHO TV REGURGITANT PEAK GRADIENT: 61 MMHG
ECHO TV REGURGITANT RADIUS PISA: 0.7 CM

## 2024-08-15 PROCEDURE — 93306 TTE W/DOPPLER COMPLETE: CPT

## 2024-08-15 PROCEDURE — 1036F TOBACCO NON-USER: CPT | Performed by: INTERNAL MEDICINE

## 2024-08-15 PROCEDURE — G8417 CALC BMI ABV UP PARAM F/U: HCPCS | Performed by: INTERNAL MEDICINE

## 2024-08-15 PROCEDURE — G8427 DOCREV CUR MEDS BY ELIG CLIN: HCPCS | Performed by: INTERNAL MEDICINE

## 2024-08-15 PROCEDURE — 99214 OFFICE O/P EST MOD 30 MIN: CPT | Performed by: INTERNAL MEDICINE

## 2024-08-15 PROCEDURE — 1123F ACP DISCUSS/DSCN MKR DOCD: CPT | Performed by: INTERNAL MEDICINE

## 2024-08-15 RX ORDER — FUROSEMIDE 20 MG/1
20 TABLET ORAL DAILY
Qty: 90 TABLET | Refills: 3 | Status: SHIPPED | OUTPATIENT
Start: 2024-08-15

## 2024-08-23 PROCEDURE — 93296 REM INTERROG EVL PM/IDS: CPT | Performed by: INTERNAL MEDICINE

## 2024-08-23 PROCEDURE — 93294 REM INTERROG EVL PM/LDLS PM: CPT | Performed by: INTERNAL MEDICINE

## 2024-11-14 ENCOUNTER — HOSPITAL ENCOUNTER (OUTPATIENT)
Age: 89
Discharge: HOME | End: 2024-11-14
Payer: MEDICARE

## 2024-11-14 ENCOUNTER — HOSPITAL ENCOUNTER (OUTPATIENT)
Dept: GENERAL RADIOLOGY | Age: 89
Discharge: HOME | End: 2024-11-14
Payer: MEDICARE

## 2024-11-14 ENCOUNTER — OFFICE VISIT (OUTPATIENT)
Dept: CARDIOLOGY CLINIC | Age: 89
End: 2024-11-14

## 2024-11-14 ENCOUNTER — TELEPHONE (OUTPATIENT)
Dept: CARDIOLOGY CLINIC | Age: 89
End: 2024-11-14

## 2024-11-14 VITALS
OXYGEN SATURATION: 97 % | HEIGHT: 67 IN | BODY MASS INDEX: 31.23 KG/M2 | DIASTOLIC BLOOD PRESSURE: 60 MMHG | HEART RATE: 70 BPM | SYSTOLIC BLOOD PRESSURE: 148 MMHG | WEIGHT: 199 LBS

## 2024-11-14 DIAGNOSIS — R60.0 LOCALIZED EDEMA: Primary | ICD-10-CM

## 2024-11-14 DIAGNOSIS — R06.02 SOB (SHORTNESS OF BREATH): ICD-10-CM

## 2024-11-14 DIAGNOSIS — I38 VALVULAR HEART DISEASE: ICD-10-CM

## 2024-11-14 DIAGNOSIS — I10 ESSENTIAL HYPERTENSION: ICD-10-CM

## 2024-11-14 LAB
ALBUMIN SERPL-MCNC: 4.1 G/DL (ref 3.4–5)
ALBUMIN/GLOB SERPL: -20.5 {RATIO} (ref 1.1–2.2)
ALP SERPL-CCNC: 38 U/L (ref 40–129)
ALT SERPL-CCNC: 20 U/L (ref 10–40)
ANION GAP SERPL CALCULATED.3IONS-SCNC: 20 MMOL/L (ref 3–16)
AST SERPL-CCNC: 23 U/L (ref 15–37)
BILIRUB SERPL-MCNC: 0.5 MG/DL (ref 0–1)
BUN SERPL-MCNC: 23 MG/DL (ref 7–20)
CALCIUM SERPL-MCNC: 9.4 MG/DL (ref 8.3–10.6)
CHLORIDE SERPL-SCNC: 102 MMOL/L (ref 99–110)
CO2 SERPL-SCNC: 17 MMOL/L (ref 21–32)
CREAT SERPL-MCNC: 1.1 MG/DL (ref 0.8–1.3)
DEPRECATED RDW RBC AUTO: 17.9 % (ref 12.4–15.4)
GFR SERPLBLD CREATININE-BSD FMLA CKD-EPI: 64 ML/MIN/{1.73_M2}
GLUCOSE SERPL-MCNC: 100 MG/DL (ref 70–99)
HCT VFR BLD AUTO: 27.4 % (ref 40.5–52.5)
HGB BLD-MCNC: 8.3 G/DL (ref 13.5–17.5)
MCH RBC QN AUTO: 24.5 PG (ref 26–34)
MCHC RBC AUTO-ENTMCNC: 30.3 G/DL (ref 31–36)
MCV RBC AUTO: 80.8 FL (ref 80–100)
NT-PROBNP SERPL-MCNC: 1207 PG/ML (ref 0–449)
PLATELET # BLD AUTO: 302 K/UL (ref 135–450)
PMV BLD AUTO: 7 FL (ref 5–10.5)
POTASSIUM SERPL-SCNC: 4.9 MMOL/L (ref 3.5–5.1)
PROT SERPL-MCNC: 3.9 G/DL (ref 6.4–8.2)
RBC # BLD AUTO: 3.39 M/UL (ref 4.2–5.9)
SODIUM SERPL-SCNC: 139 MMOL/L (ref 136–145)
WBC # BLD AUTO: 8.1 K/UL (ref 4–11)

## 2024-11-14 PROCEDURE — 80053 COMPREHEN METABOLIC PANEL: CPT

## 2024-11-14 PROCEDURE — 85027 COMPLETE CBC AUTOMATED: CPT

## 2024-11-14 PROCEDURE — 83880 ASSAY OF NATRIURETIC PEPTIDE: CPT

## 2024-11-14 PROCEDURE — 36415 COLL VENOUS BLD VENIPUNCTURE: CPT

## 2024-11-14 PROCEDURE — 71046 X-RAY EXAM CHEST 2 VIEWS: CPT

## 2024-11-14 RX ORDER — ACETAMINOPHEN 500 MG
500 TABLET ORAL EVERY 6 HOURS PRN
COMMUNITY

## 2024-11-14 RX ORDER — PREDNISONE 10 MG/1
10 TABLET ORAL DAILY
COMMUNITY
Start: 2024-10-15

## 2024-11-14 RX ORDER — METHOCARBAMOL 750 MG/1
750 TABLET, FILM COATED ORAL ONCE
COMMUNITY
Start: 2024-10-11

## 2024-11-14 NOTE — PROGRESS NOTES
[facility]     Outpatient Follow Up Note    James Candelario is 89 y.o. male who presents today with a history of AS s/p TAVR '20; tricuspid regurg,  AF s/p PPM and HTN .      CHIEF COMPLAINT / HPI:  Follow Up secondary to starting lasix for LE edema & SOB    Subjective:   His swelling had gotten worse so his PCP increased his lasix to 40 mg bid about 3 weeks ago. One week ago, it was decreased back to 40 mg daily. His wt went down about 10# but is now going back up by ~ 5#. He was 188# this morning.   Its really helped  He's SOB is worse. He is now using a Rolator or cane so he take a breath. He notices that going up inclines from the barn, he gets winded. He becomes SOB walking short-moderate distances.   He sleeps in a recliner and has done so for the past 6 years. He denies PND. He's had no cough / nausea. He has no early satiety   He checked his BP a few days ago : 120/ ; 130/60. However this morning it was 178/62    The patient is not experiencing palpitations or dizziness.     He was just started on prednisone about a month ago. He's been sitting more d/t SOB and he's aching more  He's not aware of having sleep apnea and snores less since he started sleeping in the recliner    These symptoms are worsening since the last OV.   With regard to medication therapy the patient has been compliant with prescribed regimen. They have tolerated therapy to date.     Past Medical History:   Diagnosis Date    Arthritis     Atrial fibrillation (HCC)     BPH (benign prostatic hyperplasia)     PONV (postoperative nausea and vomiting)      Social History:    Social History     Tobacco Use   Smoking Status Former    Types: Cigars   Smokeless Tobacco Former    Types: Chew   Tobacco Comments    few cigars when was young. never habit     Current Medications:  Current Outpatient Medications   Medication Sig Dispense Refill    predniSONE (DELTASONE) 10 MG tablet Take 1 tablet by mouth daily      methocarbamol (ROBAXIN) 750

## 2024-11-22 ENCOUNTER — TELEPHONE (OUTPATIENT)
Dept: CARDIOLOGY CLINIC | Age: 89
End: 2024-11-22

## 2024-11-22 PROCEDURE — 93296 REM INTERROG EVL PM/IDS: CPT | Performed by: INTERNAL MEDICINE

## 2024-11-22 PROCEDURE — 93294 REM INTERROG EVL PM/LDLS PM: CPT | Performed by: INTERNAL MEDICINE

## 2024-11-22 NOTE — TELEPHONE ENCOUNTER
Pt's daughter states he is off his Xeralto and aspirin for 2 days due to a GI bleed and they are wanting to know what the chance is of the pt having a stroke while being off the blood thinners.  The PCP will check for blood the week after Thanksgiving so pt will be off blood thinners at least one more week and they are very concerned.  It could be 2-3 weeks total at least daughter states.    Please advise.

## 2024-12-06 ENCOUNTER — OFFICE VISIT (OUTPATIENT)
Dept: CARDIOLOGY CLINIC | Age: 89
End: 2024-12-06
Payer: MEDICARE

## 2024-12-06 VITALS
WEIGHT: 193 LBS | OXYGEN SATURATION: 99 % | DIASTOLIC BLOOD PRESSURE: 70 MMHG | HEART RATE: 62 BPM | BODY MASS INDEX: 30.29 KG/M2 | SYSTOLIC BLOOD PRESSURE: 150 MMHG | HEIGHT: 67 IN

## 2024-12-06 DIAGNOSIS — I38 VALVULAR HEART DISEASE: ICD-10-CM

## 2024-12-06 DIAGNOSIS — I10 ESSENTIAL HYPERTENSION: ICD-10-CM

## 2024-12-06 DIAGNOSIS — I50.32 CHRONIC DIASTOLIC CONGESTIVE HEART FAILURE (HCC): Primary | ICD-10-CM

## 2024-12-06 DIAGNOSIS — R60.0 LOCALIZED EDEMA: ICD-10-CM

## 2024-12-06 DIAGNOSIS — R06.02 SOB (SHORTNESS OF BREATH): ICD-10-CM

## 2024-12-06 PROCEDURE — G8427 DOCREV CUR MEDS BY ELIG CLIN: HCPCS | Performed by: NURSE PRACTITIONER

## 2024-12-06 PROCEDURE — G8417 CALC BMI ABV UP PARAM F/U: HCPCS | Performed by: NURSE PRACTITIONER

## 2024-12-06 PROCEDURE — 1123F ACP DISCUSS/DSCN MKR DOCD: CPT | Performed by: NURSE PRACTITIONER

## 2024-12-06 PROCEDURE — 1036F TOBACCO NON-USER: CPT | Performed by: NURSE PRACTITIONER

## 2024-12-06 PROCEDURE — 1160F RVW MEDS BY RX/DR IN RCRD: CPT | Performed by: NURSE PRACTITIONER

## 2024-12-06 PROCEDURE — G8484 FLU IMMUNIZE NO ADMIN: HCPCS | Performed by: NURSE PRACTITIONER

## 2024-12-06 PROCEDURE — 1159F MED LIST DOCD IN RCRD: CPT | Performed by: NURSE PRACTITIONER

## 2024-12-06 PROCEDURE — 99214 OFFICE O/P EST MOD 30 MIN: CPT | Performed by: NURSE PRACTITIONER

## 2024-12-06 RX ORDER — FERROUS SULFATE 325(65) MG
325 TABLET ORAL
COMMUNITY

## 2024-12-06 RX ORDER — CYANOCOBALAMIN 1000 UG/ML
1000 INJECTION, SOLUTION INTRAMUSCULAR; SUBCUTANEOUS ONCE
COMMUNITY

## 2024-12-06 RX ORDER — FERROUS SULFATE 325(65) MG
325 TABLET, DELAYED RELEASE (ENTERIC COATED) ORAL
COMMUNITY
End: 2024-12-06

## 2024-12-06 NOTE — PATIENT INSTRUCTIONS
Begin Jardiance 10 mg daily to help keep fluid off of your system    Non-fasting labs after one week    Continue to weigh yourself every monring and bring yoru reading siwth you    Resume xarelto but not aspirin    Keep appt with Dr. Chacon 1/28    Appt with me in three months

## 2025-01-14 ENCOUNTER — PATIENT MESSAGE (OUTPATIENT)
Dept: CARDIOLOGY CLINIC | Age: 89
End: 2025-01-14

## 2025-01-15 RX ORDER — AMLODIPINE BESYLATE 5 MG/1
5 TABLET ORAL DAILY
Qty: 90 TABLET | Refills: 3 | Status: SHIPPED | OUTPATIENT
Start: 2025-01-15

## 2025-01-28 ENCOUNTER — HOSPITAL ENCOUNTER (OUTPATIENT)
Age: 89
Discharge: HOME | End: 2025-01-28
Payer: MEDICARE

## 2025-01-28 ENCOUNTER — OFFICE VISIT (OUTPATIENT)
Dept: CARDIOLOGY CLINIC | Age: 89
End: 2025-01-28
Payer: MEDICARE

## 2025-01-28 ENCOUNTER — NURSE ONLY (OUTPATIENT)
Dept: CARDIOLOGY CLINIC | Age: 89
End: 2025-01-28

## 2025-01-28 VITALS
DIASTOLIC BLOOD PRESSURE: 72 MMHG | SYSTOLIC BLOOD PRESSURE: 138 MMHG | BODY MASS INDEX: 31.14 KG/M2 | WEIGHT: 198.4 LBS | HEIGHT: 67 IN | OXYGEN SATURATION: 97 % | HEART RATE: 61 BPM

## 2025-01-28 DIAGNOSIS — Z95.0 PACEMAKER: Primary | ICD-10-CM

## 2025-01-28 DIAGNOSIS — D64.9 ANEMIA, UNSPECIFIED TYPE: ICD-10-CM

## 2025-01-28 DIAGNOSIS — I48.19 PERSISTENT ATRIAL FIBRILLATION (HCC): ICD-10-CM

## 2025-01-28 DIAGNOSIS — I48.0 PAF (PAROXYSMAL ATRIAL FIBRILLATION) (HCC): ICD-10-CM

## 2025-01-28 DIAGNOSIS — I48.0 PAF (PAROXYSMAL ATRIAL FIBRILLATION) (HCC): Primary | ICD-10-CM

## 2025-01-28 DIAGNOSIS — R00.1 BRADYCARDIA: ICD-10-CM

## 2025-01-28 DIAGNOSIS — I10 BENIGN ESSENTIAL HTN: ICD-10-CM

## 2025-01-28 DIAGNOSIS — I44.7 LBBB (LEFT BUNDLE BRANCH BLOCK): ICD-10-CM

## 2025-01-28 DIAGNOSIS — I48.20 CHRONIC ATRIAL FIBRILLATION (HCC): ICD-10-CM

## 2025-01-28 LAB
ALBUMIN SERPL-MCNC: 3.9 G/DL (ref 3.4–5)
ANION GAP SERPL CALCULATED.3IONS-SCNC: 8 MMOL/L (ref 3–16)
BUN SERPL-MCNC: 24 MG/DL (ref 7–20)
CALCIUM SERPL-MCNC: 9.1 MG/DL (ref 8.3–10.6)
CHLORIDE SERPL-SCNC: 102 MMOL/L (ref 99–110)
CO2 SERPL-SCNC: 30 MMOL/L (ref 21–32)
CREAT SERPL-MCNC: 1 MG/DL (ref 0.8–1.3)
DEPRECATED RDW RBC AUTO: 21 % (ref 12.4–15.4)
GFR SERPLBLD CREATININE-BSD FMLA CKD-EPI: 72 ML/MIN/{1.73_M2}
GLUCOSE SERPL-MCNC: 94 MG/DL (ref 70–99)
HCT VFR BLD AUTO: 31.4 % (ref 40.5–52.5)
HGB BLD-MCNC: 10 G/DL (ref 13.5–17.5)
MCH RBC QN AUTO: 27.3 PG (ref 26–34)
MCHC RBC AUTO-ENTMCNC: 31.9 G/DL (ref 31–36)
MCV RBC AUTO: 85.8 FL (ref 80–100)
PHOSPHATE SERPL-MCNC: 3.3 MG/DL (ref 2.5–4.9)
PLATELET # BLD AUTO: 302 K/UL (ref 135–450)
PMV BLD AUTO: 7.1 FL (ref 5–10.5)
POTASSIUM SERPL-SCNC: 4.1 MMOL/L (ref 3.5–5.1)
RBC # BLD AUTO: 3.66 M/UL (ref 4.2–5.9)
SODIUM SERPL-SCNC: 140 MMOL/L (ref 136–145)
WBC # BLD AUTO: 7.9 K/UL (ref 4–11)

## 2025-01-28 PROCEDURE — G8427 DOCREV CUR MEDS BY ELIG CLIN: HCPCS | Performed by: INTERNAL MEDICINE

## 2025-01-28 PROCEDURE — 85027 COMPLETE CBC AUTOMATED: CPT

## 2025-01-28 PROCEDURE — 1123F ACP DISCUSS/DSCN MKR DOCD: CPT | Performed by: INTERNAL MEDICINE

## 2025-01-28 PROCEDURE — 1126F AMNT PAIN NOTED NONE PRSNT: CPT | Performed by: INTERNAL MEDICINE

## 2025-01-28 PROCEDURE — G8417 CALC BMI ABV UP PARAM F/U: HCPCS | Performed by: INTERNAL MEDICINE

## 2025-01-28 PROCEDURE — 1036F TOBACCO NON-USER: CPT | Performed by: INTERNAL MEDICINE

## 2025-01-28 PROCEDURE — 1159F MED LIST DOCD IN RCRD: CPT | Performed by: INTERNAL MEDICINE

## 2025-01-28 PROCEDURE — 80069 RENAL FUNCTION PANEL: CPT

## 2025-01-28 PROCEDURE — 36415 COLL VENOUS BLD VENIPUNCTURE: CPT

## 2025-01-28 PROCEDURE — 99214 OFFICE O/P EST MOD 30 MIN: CPT | Performed by: INTERNAL MEDICINE

## 2025-01-28 NOTE — PROGRESS NOTES
[facility]   Electrophysiology Follow up   Date: 1/28/2025  I had the privilege of visiting James Candelario in the office.     CC: Watchman Evaluation   HPI: James Candelario is a 89 y.o. male with permanent Atrial fibrillation, s/p TAVR 5/2020, noted to have bradycardia while in hospital.  He has intermittent LBBB.     A monitor showed HR down to 30's. He has some fatigue and shortness of breath with activity.    s/p MICRA  PPM 7/28/2020    Interval History:  James presents to the office for follow up. He had a GI bleed in 11/2024. He was placed on NSAID in 10/2024 for arthritis. He reports easy bruising. He still works. He feeds cattle daily. Since stopping NSAID he has not had any bleeding.     Assessment and plan:   Symptomatic Bradycardia   -s/p MICRA  PPM 7/28/2020  -Interrogation today shows:7.1 years remaining,  ,  93.7%, Underlying VS irregular, presenting  @ 82 bpm     -8/15/2024 Echo shows EF of 55%    Needs annual echo due to high percentage of ventricular pacing.   -Repeat echocardiogram to assess LV function       Permanent Atrial Fibrillation    -ECG today shows SR   -Patient has a [id number] Score of at least 2 (age4) Continue Xarelto 20 mg daily, CrCl 102 mL/min per Cr of 0.7 on 12/14/2021     -GI bleed in 11/2024   -Discussed indications that warrant consideration for Watchman implant    -He reports he has had no bleeding since stopping NSAID   -Recommend to continue to monitor for now and can reconsider Watchman implant if he continues to be anemic    -Repeat CBC    He had GI bleed due to excessive use of NSAIDs for arthritis.  Since he has been off of it he has not had any recurrence.  He does not see for an state at this point.  We had a discussion and shared decision making section about watchman.  At this point he does not seem to have any issues that would warrant a watchman.  Will do a CBC to make sure he is not anemic and does not have occult blood loss.  Unless he is

## 2025-02-21 PROCEDURE — 93294 REM INTERROG EVL PM/LDLS PM: CPT | Performed by: INTERNAL MEDICINE

## 2025-02-21 PROCEDURE — 93296 REM INTERROG EVL PM/IDS: CPT | Performed by: INTERNAL MEDICINE

## 2025-03-07 ENCOUNTER — OFFICE VISIT (OUTPATIENT)
Dept: CARDIOLOGY CLINIC | Age: 89
End: 2025-03-07
Payer: MEDICARE

## 2025-03-07 VITALS
BODY MASS INDEX: 31.55 KG/M2 | HEART RATE: 65 BPM | OXYGEN SATURATION: 98 % | WEIGHT: 201 LBS | SYSTOLIC BLOOD PRESSURE: 142 MMHG | DIASTOLIC BLOOD PRESSURE: 72 MMHG | HEIGHT: 67 IN

## 2025-03-07 DIAGNOSIS — I38 VALVULAR HEART DISEASE: ICD-10-CM

## 2025-03-07 DIAGNOSIS — I50.32 CHRONIC DIASTOLIC CONGESTIVE HEART FAILURE (HCC): Primary | ICD-10-CM

## 2025-03-07 DIAGNOSIS — I10 BENIGN ESSENTIAL HTN: ICD-10-CM

## 2025-03-07 PROCEDURE — G8417 CALC BMI ABV UP PARAM F/U: HCPCS | Performed by: NURSE PRACTITIONER

## 2025-03-07 PROCEDURE — 1159F MED LIST DOCD IN RCRD: CPT | Performed by: NURSE PRACTITIONER

## 2025-03-07 PROCEDURE — 1036F TOBACCO NON-USER: CPT | Performed by: NURSE PRACTITIONER

## 2025-03-07 PROCEDURE — 1160F RVW MEDS BY RX/DR IN RCRD: CPT | Performed by: NURSE PRACTITIONER

## 2025-03-07 PROCEDURE — G8427 DOCREV CUR MEDS BY ELIG CLIN: HCPCS | Performed by: NURSE PRACTITIONER

## 2025-03-07 PROCEDURE — 99214 OFFICE O/P EST MOD 30 MIN: CPT | Performed by: NURSE PRACTITIONER

## 2025-03-07 PROCEDURE — 1123F ACP DISCUSS/DSCN MKR DOCD: CPT | Performed by: NURSE PRACTITIONER

## 2025-03-07 PROCEDURE — G2211 COMPLEX E/M VISIT ADD ON: HCPCS | Performed by: NURSE PRACTITIONER

## 2025-03-07 RX ORDER — SPIRONOLACTONE 25 MG/1
25 TABLET ORAL DAILY
Qty: 30 TABLET | Refills: 2 | Status: SHIPPED | OUTPATIENT
Start: 2025-03-07

## 2025-03-07 NOTE — PATIENT INSTRUCTIONS
Begin spironolactone / aldactone 25 mg once a day    Try avoiding salty food items    Try keeping your legs elevated     Non-fasting labs in one week    Appt in 4-6 weeks

## 2025-03-07 NOTE — PROGRESS NOTES
17 mmHg from 14 mmHg with echo from Feb '25 compared to Aug '24  ~s/p TAVR '20  ~recommendations from EP :  annual echo due to high percentage of ventricular pacing.         I had the opportunity to review the clinical symptoms and presentation of James Candelario.   Plan:     Begin spironolactone 25 mg daily for volume overload  BMP in one week  Resume home wts / BP checks 2-3 x / week  Avoid salty food items  Elevate legs as much as possible  F/U in 4-6 weeks    Overall the patient is stable from CV standpoint    I have addresed the patient's cardiac risk factors and adjusted pharmacologic treatment as needed. In addition, I have reinforced the need for patient directed risk factor modification.    Further evaluation will be based upon the patient's clinical course and testing results.    All questions and concerns were addressed to the patient/wife & daughter (nurse). Alternatives to my treatment were discussed.      The patient is not currently smoking.     Patient is not on a beta-blocker : bradycardic s/p PPM '20  Patient is on an ace-i/ARB/ARNI/SGLT2/GLP1/MRA    Patient is not on a statin     anti-coagulation has been recommended / prescribed for this patient. Education conducted on adverse reactions including bleeding was discussed.    The patient verbalizes understanding not to stop medications without discussing with us.    Discussed exercise: deferred  Discussed Low saturated fat/DIANA diet. Having his potato chips again / processed foods    Thank you for allowing to us to participate in the care of James Candelario.    CALEB Austin    Documentation of today's visit sent to PCP

## 2025-03-14 LAB
ANION GAP SERPL CALCULATED.3IONS-SCNC: 6 MMOL/L (ref 4–16)
BUN BLDV-MCNC: 26 MG/DL (ref 8–26)
CALCIUM SERPL-MCNC: 9 MG/DL (ref 8.5–10.4)
CHLORIDE BLD-SCNC: 102 MMOL/L (ref 98–111)
CO2: 33 MMOL/L (ref 21–31)
CREAT SERPL-MCNC: 1.19 MG/DL (ref 0.7–1.3)
EGFR (CKD-EPI): 59 ML/MIN/1.73 M2
GLUCOSE BLD-MCNC: 112 MG/DL (ref 70–99)
POTASSIUM SERPL-SCNC: 3.9 MMOL/L (ref 3.6–5.1)
SODIUM BLD-SCNC: 141 MMOL/L (ref 135–145)

## 2025-03-17 ENCOUNTER — RESULTS FOLLOW-UP (OUTPATIENT)
Dept: CARDIOLOGY CLINIC | Age: 89
End: 2025-03-17

## 2025-03-17 NOTE — TELEPHONE ENCOUNTER
Spoke with the patient wife and advised her of the results below per NPTS. She voiced understanding. Call complete

## 2025-04-04 ENCOUNTER — TELEPHONE (OUTPATIENT)
Dept: CARDIOLOGY CLINIC | Age: 89
End: 2025-04-04

## 2025-04-04 ENCOUNTER — OFFICE VISIT (OUTPATIENT)
Dept: CARDIOLOGY CLINIC | Age: 89
End: 2025-04-04

## 2025-04-04 VITALS
DIASTOLIC BLOOD PRESSURE: 70 MMHG | HEIGHT: 67 IN | HEART RATE: 64 BPM | OXYGEN SATURATION: 98 % | SYSTOLIC BLOOD PRESSURE: 152 MMHG | BODY MASS INDEX: 29.98 KG/M2 | WEIGHT: 191 LBS

## 2025-04-04 DIAGNOSIS — I10 BENIGN ESSENTIAL HTN: ICD-10-CM

## 2025-04-04 DIAGNOSIS — R06.02 SOB (SHORTNESS OF BREATH): ICD-10-CM

## 2025-04-04 DIAGNOSIS — I50.32 CHRONIC DIASTOLIC CONGESTIVE HEART FAILURE (HCC): Primary | ICD-10-CM

## 2025-04-04 DIAGNOSIS — I38 VALVULAR HEART DISEASE: ICD-10-CM

## 2025-04-04 NOTE — TELEPHONE ENCOUNTER
I spoke to daughter Yu: inst to hold DOAC x2 days then resume at 1/2 dose = 10mg daily.   Recommend revisiting GI, Dr. Olguin for input    Made appt with MXA to revisit Watchman's : 4/29 10 am    NPSR updated with plan

## 2025-04-04 NOTE — PROGRESS NOTES
[facility]     Outpatient Follow Up Note    James Candelario is 89 y.o. male who presents today with a history of AS s/p TAVR '20; tricuspid regurg,  AF s/p PPM July '20 and HTN .    His other hx includes: GIB d/t NSAIDs Nov '24    CHIEF COMPLAINT / HPI:  Follow Up secondary to volume overload starting spironolactone    Subjective:     His swelling is better. His wt went from 194# > 187#. His BP has been 161/47 - 143/49    He c/o  SOB certain times of day of which he always has.    He had c/o feeling winded lifting 40# of dog food. His hips bother him more than breathing. Walking from the kitchen to the car he's breathing ok. He gets out of breath if he walks too fast or lifts things. 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 He landed on his Rt arm and laid there for several minutes. He tore his skin from his arm. By the time his daughter found him, his skin was bunched and blood dried. She cleaned it and wrapped it (she continues to attend to him daily).     He sleeps in recliner for comfort and has done so for several years / back discomfort. He denies PND.  The patient is not experiencing palpitations or dizziness.     He sent a stool spec in yesterday suspicious of bleeding again. His results are pending yet.     These symptoms have improved since the last OV.   With regard to medication therapy the patient has been compliant with prescribed regimen. They have tolerated therapy to date.     Past Medical History:   Diagnosis Date    Arthritis     Atrial fibrillation (HCC)     BPH (benign prostatic hyperplasia)     PONV (postoperative nausea and vomiting)      Social History:    Social History     Tobacco Use   Smoking Status Former    Types: Cigars   Smokeless Tobacco Former    Types: Chew   Tobacco Comments    few cigars when was young. never habit     Current Medications:  Current Outpatient Medications

## 2025-04-24 DIAGNOSIS — D64.9 ANEMIA, UNSPECIFIED TYPE: Primary | ICD-10-CM

## 2025-04-24 DIAGNOSIS — Z87.19 HISTORY OF GI BLEED: ICD-10-CM

## 2025-04-24 NOTE — PROGRESS NOTES
Normal wall motion.    Aortic Valve: Fallon Mateus 3 Ultra transcatheter bioprosthetic valve with a size of 26 mm. AV mean gradient is 17 mmHg. No regurgitation. No stenosis. AV peak gradient is 31 mmHg. AV peak velocity is 2.8 m/s. AV area by continuity VTI is 1.7 cm2.    Mitral Valve: Moderate annular calcification at the posterior leaflet. Mild regurgitation. No stenosis noted.    Tricuspid Valve: Valve structure is normal. Moderate regurgitation. The estimated RVSP is 68 mmHg.    Pulmonic Valve: The pulmonic valve was not well visualized. Trace regurgitation. No stenosis noted.    Left Atrium: Left atrium is dilated. Left atrial volume index is moderately increased (42-48 mL/m2). LA Vol Index is  40 ml/m2.    Right Atrium: Right atrium is moderately dilated.    IVC/SVC: IVC diameter is less than or equal to 21 mm and decreases less than 50% during inspiration; therefore the estimated right atrial pressure is intermediate (~8 mmHg).    Image quality is adequate.    Echo 7/28/2022  *Left ventricle - normal size, moderate concentric LVH, normal function with EF of 60%  *Right ventricle - dilated, normal function  *Aortic valve - well seated TAVR valve (26mm ES3U), PV 2.58m/s, EOA 2.27cm2, no regurgitation  *Mitral valve - thickened, mild regurgitation  *Tricuspid valve - moderate regurgitation with PASP of 64mmHg        Echo: 6/2020   Normal left ventricle size, wall thickness and systolic function with an estimated ejection fraction of 55%. No regional wall motion abnormalities are seen.   Moderate tricuspid regurgitation. PASP 59mmHg. PHTN.     GXT: 7/2017  Small sized inferior fixed defect of mild intensity consistent with    diaphragmatic artifact. A small area of fibrosis is not excluded.    No ischemia.    Normal LV function.    Overall findings represent a low risk scan       I independently reviewed the cardiac diagnostic studies, ECG and relevant imaging studies.     Lab Results   Component Value Date

## 2025-04-24 NOTE — PROGRESS NOTES
Spoke with patient's daughter regarding upcoming appointment. States he has recurrence of GIB. Was given \"iron shot\" by PCP. H/H 8.5 4/7/2025. He has since resumed Xarelto at half dose. Will fax CBC order to [facility].

## 2025-04-25 LAB
ANION GAP SERPL CALCULATED.3IONS-SCNC: 8 MMOL/L (ref 4–16)
BUN BLDV-MCNC: 33 MG/DL (ref 8–26)
CALCIUM SERPL-MCNC: 9 MG/DL (ref 8.5–10.4)
CHLORIDE BLD-SCNC: 104 MMOL/L (ref 98–111)
CO2: 30 MMOL/L (ref 21–31)
CREAT SERPL-MCNC: 1.31 MG/DL (ref 0.7–1.3)
EGFR (CKD-EPI): 52 ML/MIN/1.73 M2
GLUCOSE BLD-MCNC: 112 MG/DL (ref 70–99)
HCT VFR BLD CALC: 34.4 % (ref 40–50)
HEMOGLOBIN: 10.8 G/DL (ref 13.5–16.5)
MCH RBC QN AUTO: 27.5 PG (ref 27–33)
MCHC RBC AUTO-ENTMCNC: 31.3 G/DL (ref 32–36)
MCV RBC AUTO: 87.9 FL (ref 82–97)
PDW BLD-RTO: 17.9 %
PLATELET # BLD: 241 THOU/MCL (ref 140–375)
PMV BLD AUTO: 6.5 FL (ref 7.4–11.5)
POTASSIUM SERPL-SCNC: 4.9 MMOL/L (ref 3.6–5.1)
RBC # BLD: 3.92 MIL/MCL (ref 4.4–5.8)
SODIUM BLD-SCNC: 142 MMOL/L (ref 135–145)
WBC # BLD: 5.7 THOU/MCL (ref 3.6–10.5)

## 2025-04-29 ENCOUNTER — CLINICAL SUPPORT (OUTPATIENT)
Dept: CARDIOLOGY CLINIC | Age: 89
End: 2025-04-29

## 2025-04-29 ENCOUNTER — OFFICE VISIT (OUTPATIENT)
Dept: CARDIOLOGY CLINIC | Age: 89
End: 2025-04-29
Payer: MEDICARE

## 2025-04-29 VITALS
OXYGEN SATURATION: 99 % | BODY MASS INDEX: 29.91 KG/M2 | SYSTOLIC BLOOD PRESSURE: 118 MMHG | WEIGHT: 190.6 LBS | DIASTOLIC BLOOD PRESSURE: 70 MMHG | HEIGHT: 67 IN | HEART RATE: 63 BPM

## 2025-04-29 DIAGNOSIS — I48.20 CHRONIC ATRIAL FIBRILLATION (HCC): ICD-10-CM

## 2025-04-29 DIAGNOSIS — R00.1 BRADYCARDIA: ICD-10-CM

## 2025-04-29 DIAGNOSIS — I48.19 PERSISTENT ATRIAL FIBRILLATION (HCC): Primary | ICD-10-CM

## 2025-04-29 DIAGNOSIS — Z95.0 PACEMAKER: ICD-10-CM

## 2025-04-29 DIAGNOSIS — K92.2 GASTROINTESTINAL HEMORRHAGE, UNSPECIFIED GASTROINTESTINAL HEMORRHAGE TYPE: ICD-10-CM

## 2025-04-29 DIAGNOSIS — I48.19 PERSISTENT ATRIAL FIBRILLATION (HCC): ICD-10-CM

## 2025-04-29 DIAGNOSIS — Z95.0 PACEMAKER: Primary | ICD-10-CM

## 2025-04-29 PROCEDURE — G8417 CALC BMI ABV UP PARAM F/U: HCPCS | Performed by: INTERNAL MEDICINE

## 2025-04-29 PROCEDURE — 99214 OFFICE O/P EST MOD 30 MIN: CPT | Performed by: INTERNAL MEDICINE

## 2025-04-29 PROCEDURE — 1123F ACP DISCUSS/DSCN MKR DOCD: CPT | Performed by: INTERNAL MEDICINE

## 2025-04-29 PROCEDURE — 1036F TOBACCO NON-USER: CPT | Performed by: INTERNAL MEDICINE

## 2025-04-29 PROCEDURE — 93000 ELECTROCARDIOGRAM COMPLETE: CPT | Performed by: INTERNAL MEDICINE

## 2025-04-29 PROCEDURE — 1159F MED LIST DOCD IN RCRD: CPT | Performed by: INTERNAL MEDICINE

## 2025-04-29 PROCEDURE — G8427 DOCREV CUR MEDS BY ELIG CLIN: HCPCS | Performed by: INTERNAL MEDICINE

## 2025-04-29 PROCEDURE — 1126F AMNT PAIN NOTED NONE PRSNT: CPT | Performed by: INTERNAL MEDICINE

## 2025-04-29 PROCEDURE — G2211 COMPLEX E/M VISIT ADD ON: HCPCS | Performed by: INTERNAL MEDICINE

## 2025-04-29 NOTE — PATIENT INSTRUCTIONS
Continue Xarelto 10 mg for one more week and then increase to 20 mg daily    You will receive a call from Randy to arrange Watchman implant

## 2025-04-30 ENCOUNTER — TELEPHONE (OUTPATIENT)
Dept: CARDIOLOGY CLINIC | Age: 89
End: 2025-04-30

## 2025-04-30 NOTE — TELEPHONE ENCOUNTER
Spoke with daughter, pt is scheduled on 6-20-25 at 9:30am with  at [facility] for Shared Decision

## 2025-05-12 NOTE — TELEPHONE ENCOUNTER
Received refill request for SPIRONOLACTONE 25 MG TABLET  from [facility] pharmacy.     Last OV: 4/29/25    Next OV: 6/20/25    Last Labs: bmp 4/25/25    Last Filled: 3/7/25

## 2025-05-13 RX ORDER — SPIRONOLACTONE 25 MG/1
25 TABLET ORAL DAILY
Qty: 90 TABLET | Refills: 0 | Status: SHIPPED | OUTPATIENT
Start: 2025-05-13

## 2025-05-23 PROCEDURE — 93296 REM INTERROG EVL PM/IDS: CPT | Performed by: INTERNAL MEDICINE

## 2025-05-23 PROCEDURE — 93294 REM INTERROG EVL PM/LDLS PM: CPT | Performed by: INTERNAL MEDICINE

## 2025-06-02 DIAGNOSIS — I50.32 CHRONIC DIASTOLIC CONGESTIVE HEART FAILURE (HCC): ICD-10-CM

## 2025-06-02 NOTE — TELEPHONE ENCOUNTER
Requested Prescriptions     Pending Prescriptions Disp Refills    JARDIANCE 10 MG tablet [Pharmacy Med Name: JARDIANCE 10 MG TABLET] 30 tablet 5     Sig: TAKE 1 TABLET BY MOUTH DAILY      Last OV:  4/29/2025 MXA    Next OV: 6/20/2025 VNZ    Last Labs: 04/25/2025 BMP    Last Filled: 12/06/2024 NPTS

## 2025-06-04 RX ORDER — EMPAGLIFLOZIN 10 MG/1
10 TABLET, FILM COATED ORAL DAILY
Qty: 30 TABLET | Refills: 5 | Status: SHIPPED | OUTPATIENT
Start: 2025-06-04

## 2025-06-19 PROBLEM — I10 HTN (HYPERTENSION): Status: ACTIVE | Noted: 2025-06-19

## 2025-06-19 NOTE — ASSESSMENT & PLAN NOTE
[id number] Score for Atrial Fibrillation Stroke Risk   Risk   Factors  Component Value   C CHF Yes 1   H HTN Yes 1   A2 Age >= 75 Yes,  (89 y.o.) 2   D DM No 0   S2 Prior Stroke/TIA No 0   V Vascular Disease No 0   A Age 65-74 No,  (89 y.o.) 0   Sc Sex male 0    [id number]  Score  4     Maintained on Xarelto 20mg  Consider Eliquis for advanced age

## 2025-06-19 NOTE — PROGRESS NOTES
GENERAL CARDIOLOGY    REFERRING PROVIDER  Agustín Burgos DO     CHIEF COMPLAINT  Watchman Shared Decision       HPI  James Candelario is a 89 y.o. male presents as a referral for Watchman shared decision making.     Indication:  GIB NSAIDS for arthritis 2024, stopped NSAIDS and symptoms improved  4/2025 Recurrence of dark stools, guiac positive 4/14/25  Anemia    Medical history reviewed, significant for pAfib, bradycardia s/p PPM 2020, HTN and Aortic stenosis sp TAVR 2020.    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, still works on his farm  No reported pre syncopal symptoms or syncope  No reported chest pain, significant dyspnea, palpitations, leg edema, PND/orthopnea    ASSESSMENT AND PLAN  Persistent atrial fibrillation (HCC)  [id number] Score for Atrial Fibrillation Stroke Risk   Risk   Factors  Component Value   C CHF Yes 1   H HTN Yes 1   A2 Age >= 75 Yes,  (89 y.o.) 2   D DM No 0   S2 Prior Stroke/TIA No 0   V Vascular Disease No 0   A Age 65-74 No,  (89 y.o.) 0   Sc Sex male 0    [id number]  Score  4     Maintained on Xarelto 20mg  Consider Eliquis for advanced age    Bradycardia  S/p PPM 2020 Micra ppm  Follows with Cardiac EP    S/P TAVR (transcatheter aortic valve replacement)  S/p TAVR 2020 DCE  Echo 2/10/2025 with normal function      HTN (hypertension)  Bp today is 124/68  Continue medication management      Pulmonary HTN (HCC)  Echo 2/10/25; RVSP is 68 mmHg  Made patient and family aware of findings  At this time patient reports being active and working without significant dyspnea  Discussed this as contributing

## 2025-06-20 ENCOUNTER — OFFICE VISIT (OUTPATIENT)
Dept: CARDIOLOGY CLINIC | Age: 89
End: 2025-06-20

## 2025-06-20 ENCOUNTER — TELEPHONE (OUTPATIENT)
Dept: CARDIOLOGY CLINIC | Age: 89
End: 2025-06-20

## 2025-06-20 VITALS
WEIGHT: 186.9 LBS | DIASTOLIC BLOOD PRESSURE: 68 MMHG | OXYGEN SATURATION: 99 % | SYSTOLIC BLOOD PRESSURE: 124 MMHG | HEART RATE: 72 BPM | HEIGHT: 67 IN | BODY MASS INDEX: 29.34 KG/M2

## 2025-06-20 DIAGNOSIS — I27.20 PULMONARY HTN (HCC): ICD-10-CM

## 2025-06-20 DIAGNOSIS — R00.1 BRADYCARDIA: ICD-10-CM

## 2025-06-20 DIAGNOSIS — Z95.2 S/P TAVR (TRANSCATHETER AORTIC VALVE REPLACEMENT): ICD-10-CM

## 2025-06-20 DIAGNOSIS — I48.19 PERSISTENT ATRIAL FIBRILLATION (HCC): Primary | ICD-10-CM

## 2025-06-20 DIAGNOSIS — I10 PRIMARY HYPERTENSION: ICD-10-CM

## 2025-06-20 DIAGNOSIS — Z01.810 ENCOUNTER FOR PREOPERATIVE ASSESSMENT FOR NONCORONARY CARDIAC SURGERY: ICD-10-CM

## 2025-06-20 NOTE — ASSESSMENT & PLAN NOTE
James Candelario is being referred for Left Atrial Appendage Closure with WATCHMAN device for management of stroke risk resulting from non-valvular atrial fibrillation. Based on the patient's medical history after reviewing patient chart, patient is a poor candidate for long-term oral-anticoagulation, but may be tolerant of short term treatment with anti-thrombotic therapy as necessary.  Patient has two recent episodes of GIB.      Patient is elevated risk for stroke or systemic thromboembolism. Patient [id number]  is calculated:      Risk   Factors  Component Value   C CHF Yes 1   H HTN Yes 1   A2 Age >= 75 Yes,  (89 y.o.) 2   D DM No 0   S2 Prior Stroke/TIA No 0   V Vascular Disease No 0   A Age 65-74 No,  (89 y.o.) 0   Sc Sex male 0    [id number]  Score  4     Together with patient, his wife and daughter, we discussed his unique stroke and bleeding risk both on and off oral-anticoagulation, and the rationale for this referral.  Based on both stroke and bleeding risk, a shared decision has been.  The patient's Daughter stated that they did not want to proceed but presented today for further discussion.  The Daughter stated that since aspirin was stopped, he has not had any further bleeding events on Xarelto 20 mg daily.  The patient's Daughter stated that they plan to hold off on any procedural therapies at this time.

## 2025-06-20 NOTE — ASSESSMENT & PLAN NOTE
Echo 2/10/25; RVSP is 68 mmHg  Made patient and family aware of findings  At this time patient reports being active and working without significant dyspnea  Discussed this as contributing to his pre operative risk evaluation

## 2025-06-20 NOTE — TELEPHONE ENCOUNTER
We saw James in office today for Shared decision, his daughter reports that coming into visit today, patient really does not want to pursue. We did give the go ahead, but they want to follow up with [facility]. Looks like appointment is scheduled for September

## 2025-08-14 ENCOUNTER — OFFICE VISIT (OUTPATIENT)
Dept: CARDIOLOGY CLINIC | Age: 89
End: 2025-08-14
Payer: MEDICARE

## 2025-08-14 VITALS
SYSTOLIC BLOOD PRESSURE: 130 MMHG | OXYGEN SATURATION: 95 % | BODY MASS INDEX: 27.47 KG/M2 | HEART RATE: 62 BPM | DIASTOLIC BLOOD PRESSURE: 58 MMHG | HEIGHT: 67 IN | WEIGHT: 175 LBS

## 2025-08-14 DIAGNOSIS — I35.0 NONRHEUMATIC AORTIC VALVE STENOSIS: ICD-10-CM

## 2025-08-14 DIAGNOSIS — I35.0 AORTIC VALVE STENOSIS, NONRHEUMATIC: Primary | ICD-10-CM

## 2025-08-14 DIAGNOSIS — Z95.2 S/P TAVR (TRANSCATHETER AORTIC VALVE REPLACEMENT): ICD-10-CM

## 2025-08-14 DIAGNOSIS — I48.20 CHRONIC ATRIAL FIBRILLATION (HCC): ICD-10-CM

## 2025-08-14 DIAGNOSIS — I10 ESSENTIAL HYPERTENSION: ICD-10-CM

## 2025-08-14 DIAGNOSIS — Z95.2 HISTORY OF TRANSCATHETER AORTIC VALVE REPLACEMENT (TAVR): ICD-10-CM

## 2025-08-14 PROCEDURE — G8427 DOCREV CUR MEDS BY ELIG CLIN: HCPCS | Performed by: INTERNAL MEDICINE

## 2025-08-14 PROCEDURE — G8417 CALC BMI ABV UP PARAM F/U: HCPCS | Performed by: INTERNAL MEDICINE

## 2025-08-14 PROCEDURE — 1036F TOBACCO NON-USER: CPT | Performed by: INTERNAL MEDICINE

## 2025-08-14 PROCEDURE — 1123F ACP DISCUSS/DSCN MKR DOCD: CPT | Performed by: INTERNAL MEDICINE

## 2025-08-14 PROCEDURE — 99214 OFFICE O/P EST MOD 30 MIN: CPT | Performed by: INTERNAL MEDICINE

## 2025-08-14 PROCEDURE — 1159F MED LIST DOCD IN RCRD: CPT | Performed by: INTERNAL MEDICINE

## 2025-08-21 ENCOUNTER — TELEPHONE (OUTPATIENT)
Dept: CARDIOLOGY CLINIC | Age: 89
End: 2025-08-21

## (undated) DEVICE — OPTIFOAM GENTLE LIQUITRAP, SACRUM, 7"X7": Brand: MEDLINE

## (undated) DEVICE — LASER SURG W22XH58IN D36IN 475LB SLD STATE FREQ DOUBLED

## (undated) DEVICE — SYRINGE MED 10ML SLIP TIP BLNT FILL AND LUERLOCK DISP

## (undated) DEVICE — SOLUTION IRRIGATION STRL H2O 1000 ML UROMATIC CONTAINER

## (undated) DEVICE — BAG DRAINAGE NS

## (undated) DEVICE — CATHETER EXT URIN DIA35MM STD M SELF ADH WATERTIGHT SEAL

## (undated) DEVICE — SUPPORT WRST AD W3.5XL9IN DIA14.5IN ART SFT ADJ HK AND LOOP

## (undated) DEVICE — DRESSING WND SM W2.5XL4IN BRTH W/ CATH SECUREMENT AND

## (undated) DEVICE — TRAY PREP DRY W/ PREM GLV 2 APPL 6 SPNG 2 UNDPD 1 OVERWRAP

## (undated) DEVICE — CATHETER URETH 20FR 30CC BLLN SIL ELASTMR F 3 W SPEC M RND

## (undated) DEVICE — PRESSURE MONITORING SET: Brand: TRUWAVE

## (undated) DEVICE — BLANKET WRM W29.9XL79.1IN UP BODY FORC AIR MISTRAL-AIR

## (undated) DEVICE — SET CATH 20GA L1.75IN RAD ART POLYUR RADPQ W/ INTEGR

## (undated) DEVICE — SOLUTION IV IRRIG WATER 1000ML POUR BRL 2F7114

## (undated) DEVICE — Z DISCONTINUED USE 2516375 APPLICATOR MEDICATED 3 CC CLR STRL CHLORAPREP

## (undated) DEVICE — SYRINGE MED 30ML STD CLR PLAS LUERLOCK TIP N CTRL DISP

## (undated) DEVICE — GLOVE ORANGE PI 7   MSG9070

## (undated) DEVICE — GLOVE SURG SZ 75 L12IN FNGR THK94MIL TRNSLUC YEL LTX

## (undated) DEVICE — NEEDLE HYPO 18GA L1.5IN THN WALL PIVOTING SHLD BVL ORIENTED

## (undated) DEVICE — SOLUTION IRRIG 2000ML 0.9% SOD CHL USP UROMATIC PLAS CONT

## (undated) DEVICE — SET EXTN PRIMING 4.9ML L30IN INCL SLDE CLMP SPIN M LUERLOCK

## (undated) DEVICE — Device: Brand: MEDEX

## (undated) DEVICE — CYSTO PACK: Brand: MEDLINE INDUSTRIES, INC.

## (undated) DEVICE — GEL US 20GM NONIRRITATING OVERWRAPPED FILE PCH TRNSMIT

## (undated) DEVICE — Y-TYPE TUR/BLADDER IRRIGATION SET, REGULATING CLAMP

## (undated) DEVICE — CATHETER IV 18GA L1.25N GREEN FEP SFTY STRGHT HUB RDPQUE DSP

## (undated) DEVICE — SET INFUS 25ML L117IN PMP MOD CK VLV RLER CLMP 2 SMRTSITE

## (undated) DEVICE — BAG,DRAINAGE,4L,A/R TOWER,LL,SLIDE TAP: Brand: MEDLINE